# Patient Record
Sex: FEMALE | Race: WHITE | NOT HISPANIC OR LATINO | Employment: FULL TIME | URBAN - METROPOLITAN AREA
[De-identification: names, ages, dates, MRNs, and addresses within clinical notes are randomized per-mention and may not be internally consistent; named-entity substitution may affect disease eponyms.]

---

## 2017-01-12 ENCOUNTER — OFFICE VISIT (OUTPATIENT)
Dept: OPTOMETRY | Facility: CLINIC | Age: 62
End: 2017-01-12
Payer: COMMERCIAL

## 2017-01-12 DIAGNOSIS — H52.203 HYPEROPIA WITH ASTIGMATISM AND PRESBYOPIA, BILATERAL: ICD-10-CM

## 2017-01-12 DIAGNOSIS — Z01.00 ENCOUNTER FOR ROUTINE EYE AND VISION EXAMINATION: Primary | ICD-10-CM

## 2017-01-12 DIAGNOSIS — H52.4 HYPEROPIA WITH ASTIGMATISM AND PRESBYOPIA, BILATERAL: ICD-10-CM

## 2017-01-12 DIAGNOSIS — H52.03 HYPEROPIA WITH ASTIGMATISM AND PRESBYOPIA, BILATERAL: ICD-10-CM

## 2017-01-12 DIAGNOSIS — Z13.5 SCREENING FOR EYE CONDITION: ICD-10-CM

## 2017-01-12 PROCEDURE — 99999 PR PBB SHADOW E&M-EST. PATIENT-LVL III: CPT | Mod: PBBFAC,,, | Performed by: OPTOMETRIST

## 2017-01-12 PROCEDURE — 92004 COMPRE OPH EXAM NEW PT 1/>: CPT | Mod: S$GLB,,, | Performed by: OPTOMETRIST

## 2017-01-12 PROCEDURE — 92015 DETERMINE REFRACTIVE STATE: CPT | Mod: S$GLB,,, | Performed by: OPTOMETRIST

## 2017-01-12 NOTE — PROGRESS NOTES
"HPI     Concerns About Ocular Health    Additional comments: Eye exam and refraction.  No acute ocular/vision   problems.  Good VA with glasses.   Wears full-time            Comments   Patient's age: 61 y.o. WF  Occupation: [employer]  Approximate date of last eye examination:  10/2015  Name of last eye doctor seen: Dr. Reis   City/State: [place]   Wears glasses? Yes      If yes, wears  Full-time or part-time?  Full time   Present glasses are: Bifocal, SV Distance, SV Reading?  Bifocal  Approximate age of present glasses:  15 months    Got new glasses following last exam, or subsequently?:  yes   Any problem with VA with glasses?  No  Wears CLs?:  No  Headaches?  No  Eye pain/discomfort?  No                                                                                     Flashes?  No  Floaters?  No  Diplopia/Double vision?  No  Patient's Ocular History:         Any eye surgeries? No         Any eye injury?  No         Any treatment for eye disease?  No  Family history of eye disease?  No  Significant patient medical history:         1. Diabetes?  No       If yes, IDDM or NIDDM?  n/a   2. HBP?  No              3. Other (describe):     ! OTC eyedrops currently using:  No   ! Prescription eye meds currently using:  No   ! Any history of allergy/adverse reaction to any eye meds used   previously?  No    ! Any history of allergy/adverse reaction to eyedrops used during prior   eye exam(s)? No    ! Any history of allergy/adverse reaction to Novacaine or similar meds?   No   ! Any history of allergy/adverse reaction to Epinephrine or similar meds?   No    ! Patient okay with use of anesthetic eyedrops to check eye pressure?    Yes         ! Patient okay with use of eyedrops to dilate pupils today?  Yes    !  Allergies/Medications/Medical History/Family History reviewed today?    Yes       PD =   63/59  Desired reading distance =  14.5"                                                                Last edited by " Narendra Rothman, OD on 1/12/2017  4:24 PM. (History)            Assessment /Plan     For exam results, see Encounter Report.    1. Encounter for routine eye and vision examination     2. Screening for eye condition     3. Hyperopia with astigmatism and presbyopia, bilateral                      Good ocular health in both eyes.  Hyperopia with astigmatism in each eye, with good correctable VA in each eye.  Presbyopia consistent with age.  New spectacle lens Rx issued for use as desired.  Recommend full-time wear.  Recheck in one year - or prior if any problems noted in the interim

## 2017-01-12 NOTE — MR AVS SNAPSHOT
[facility] - Optometry  [address]  [facility] [address]  Phone: [phone]  Fax: [phone]                  Verena Stephenson   2017 3:45 PM   Office Visit    Description:  Female : 1955   Provider:  Narendra Rothman OD   Department:  [facility] - Optometry           Reason for Visit     Concerns About Ocular Health                To Do List           Goals (5 Years of Data)     None      [facility] On Call     [facility] On Call Nurse [facility] Line -  Assistance  Registered nurses in the [facility] On Call Center provide clinical advisement, health education, appointment booking, and other advisory services.  Call for this free service at [phone].             Medications           Message regarding Medications     Verify the changes and/or additions to your medication regime listed below are the same as discussed with your clinician today.  If any of these changes or additions are incorrect, please notify your healthcare provider.             Verify that the below list of medications is an accurate representation of the medications you are currently taking.  If none reported, the list may be blank. If incorrect, please contact your healthcare provider. Carry this list with you in case of emergency.           Current Medications     clonazePAM (KLONOPIN) 0.5 MG tablet Take 0.5 mg by mouth 2 (two) times daily as needed for Anxiety.    escitalopram oxalate (LEXAPRO) 10 MG tablet Take 1 tablet (10 mg total) by mouth once daily.    gabapentin (NEURONTIN) 100 MG capsule Take 1 capsule (100 mg total) by mouth 3 (three) times daily.    lubiprostone (AMITIZA) 24 MCG Cap Take 1 capsule (24 mcg total) by mouth 2 (two) times daily with meals.    MELOXICAM (MOBIC ORAL) Take by mouth.    ondansetron (ZOFRAN) 4 MG tablet TAKE 1 TABLET (4 MG TOTAL) BY MOUTH DAILY AS NEEDED FOR NAUSEA.    ondansetron (ZOFRAN, AS HYDROCHLORIDE,) 4 MG tablet Take 1 tablet (4 mg total) by mouth every 8 (eight) hours as  needed for Nausea.    pantoprazole (PROTONIX) 40 MG tablet Take 1 tablet (40 mg total) by mouth once daily.    senna-docusate 8.6-50 mg (PERICOLACE) 8.6-50 mg per tablet Take 2 tablets by mouth daily as needed for Constipation.           Clinical Reference Information           Allergies as of 1/12/2017     No Known Allergies      Immunizations Administered on Date of Encounter - 1/12/2017     None      Instructions    Good ocular health in both eyes.  Hyperopia with astigmatism in each eye, with good correctable VA in each eye.  Presbyopia consistent with age.  New spectacle lens Rx issued for use as desired.  Recommend full-time wear.  Recheck in one year - or prior if any problems noted in the interim

## 2017-01-12 NOTE — PATIENT INSTRUCTIONS
Good ocular health in both eyes.  Hyperopia with astigmatism in each eye, with good correctable VA in each eye.  Presbyopia consistent with age.  New spectacle lens Rx issued for use as desired.  Recommend full-time wear.  Recheck in one year - or prior if any problems noted in the interim

## 2017-07-28 PROBLEM — F41.9 ANXIETY: Chronic | Status: ACTIVE | Noted: 2017-07-28

## 2018-02-23 PROBLEM — I10 ESSENTIAL HYPERTENSION: Chronic | Status: ACTIVE | Noted: 2018-02-23

## 2018-05-16 PROBLEM — F41.9 ANXIETY: Chronic | Status: RESOLVED | Noted: 2017-07-28 | Resolved: 2018-05-16

## 2018-08-09 PROBLEM — M41.20 IDIOPATHIC SCOLIOSIS: Chronic | Status: ACTIVE | Noted: 2018-08-09

## 2018-08-09 PROBLEM — F32.9 MAJOR DEPRESSIVE DISORDER: Chronic | Status: ACTIVE | Noted: 2018-08-09

## 2019-01-22 PROBLEM — R68.84 JAW PAIN: Status: ACTIVE | Noted: 2019-01-22

## 2019-04-12 ENCOUNTER — OFFICE VISIT (OUTPATIENT)
Dept: PODIATRY | Facility: CLINIC | Age: 64
End: 2019-04-12
Payer: COMMERCIAL

## 2019-04-12 ENCOUNTER — HOSPITAL ENCOUNTER (OUTPATIENT)
Dept: RADIOLOGY | Facility: CLINIC | Age: 64
Discharge: HOME | End: 2019-04-12
Attending: PODIATRIST
Payer: COMMERCIAL

## 2019-04-12 VITALS
BODY MASS INDEX: 33.79 KG/M2 | SYSTOLIC BLOOD PRESSURE: 119 MMHG | DIASTOLIC BLOOD PRESSURE: 63 MMHG | HEIGHT: 61 IN | WEIGHT: 179 LBS | HEART RATE: 59 BPM

## 2019-04-12 DIAGNOSIS — M72.2 PLANTAR FASCIITIS: ICD-10-CM

## 2019-04-12 DIAGNOSIS — M79.671 FOOT PAIN, BILATERAL: ICD-10-CM

## 2019-04-12 DIAGNOSIS — M79.672 FOOT PAIN, BILATERAL: ICD-10-CM

## 2019-04-12 DIAGNOSIS — M77.9 CAPSULITIS: Primary | ICD-10-CM

## 2019-04-12 DIAGNOSIS — M24.573 EQUINUS CONTRACTURE OF ANKLE: ICD-10-CM

## 2019-04-12 DIAGNOSIS — M77.9 CAPSULITIS: ICD-10-CM

## 2019-04-12 PROCEDURE — 3078F DIAST BP <80 MM HG: CPT | Mod: CPTII,S$GLB,, | Performed by: PODIATRIST

## 2019-04-12 PROCEDURE — 29540 STRAPPING ANKLE &/FOOT: CPT | Mod: 50,S$GLB,, | Performed by: PODIATRIST

## 2019-04-12 PROCEDURE — 3074F PR MOST RECENT SYSTOLIC BLOOD PRESSURE < 130 MM HG: ICD-10-PCS | Mod: CPTII,S$GLB,, | Performed by: PODIATRIST

## 2019-04-12 PROCEDURE — 99999 PR PBB SHADOW E&M-EST. PATIENT-LVL III: CPT | Mod: PBBFAC,,, | Performed by: PODIATRIST

## 2019-04-12 PROCEDURE — 99999 PR PBB SHADOW E&M-EST. PATIENT-LVL III: ICD-10-PCS | Mod: PBBFAC,,, | Performed by: PODIATRIST

## 2019-04-12 PROCEDURE — 29540 PR STRAPPING; ANKLE &/OR FOOT: ICD-10-PCS | Mod: 50,S$GLB,, | Performed by: PODIATRIST

## 2019-04-12 PROCEDURE — 3008F BODY MASS INDEX DOCD: CPT | Mod: CPTII,S$GLB,, | Performed by: PODIATRIST

## 2019-04-12 PROCEDURE — 3008F PR BODY MASS INDEX (BMI) DOCUMENTED: ICD-10-PCS | Mod: CPTII,S$GLB,, | Performed by: PODIATRIST

## 2019-04-12 PROCEDURE — 99203 OFFICE O/P NEW LOW 30 MIN: CPT | Mod: 25,S$GLB,, | Performed by: PODIATRIST

## 2019-04-12 PROCEDURE — 73630 XR FOOT COMPLETE 3 VIEW BILATERAL: ICD-10-PCS | Mod: 26,S$GLB,, | Performed by: RADIOLOGY

## 2019-04-12 PROCEDURE — 73630 X-RAY EXAM OF FOOT: CPT | Mod: 50,TC,FY,PO

## 2019-04-12 PROCEDURE — 73630 X-RAY EXAM OF FOOT: CPT | Mod: 26,S$GLB,, | Performed by: RADIOLOGY

## 2019-04-12 PROCEDURE — 3078F PR MOST RECENT DIASTOLIC BLOOD PRESSURE < 80 MM HG: ICD-10-PCS | Mod: CPTII,S$GLB,, | Performed by: PODIATRIST

## 2019-04-12 PROCEDURE — 99203 PR OFFICE/OUTPT VISIT, NEW, LEVL III, 30-44 MIN: ICD-10-PCS | Mod: 25,S$GLB,, | Performed by: PODIATRIST

## 2019-04-12 PROCEDURE — 3074F SYST BP LT 130 MM HG: CPT | Mod: CPTII,S$GLB,, | Performed by: PODIATRIST

## 2019-04-12 RX ORDER — LIDOCAINE HYDROCHLORIDE 20 MG/ML
JELLY TOPICAL
Qty: 30 ML | Refills: 2 | Status: SHIPPED | OUTPATIENT
Start: 2019-04-12 | End: 2019-04-17

## 2019-04-12 NOTE — PROGRESS NOTES
Subjective:      Patient ID: Verena Stephenson is a 63 y.o. female.    Chief Complaint: Foot Pain (bi lateral)    Burning throbbing pain bottom of heel and forefoot both feet.  Gradual onset, worsening over past several weeks, aggravated by increased weight bearing, shoe gear, pressure.  No previous medical treatment.  OTC pain med not helping. Denies trauma, surgery.    Review of Systems   Constitution: Negative for chills, diaphoresis, fever, malaise/fatigue and night sweats.   Cardiovascular: Negative for claudication, cyanosis, leg swelling and syncope.   Skin: Negative for color change, dry skin, nail changes, rash, suspicious lesions and unusual hair distribution.   Musculoskeletal: Negative for falls, joint pain, joint swelling, muscle cramps, muscle weakness and stiffness.   Gastrointestinal: Negative for constipation, diarrhea, nausea and vomiting.   Neurological: Negative for brief paralysis, disturbances in coordination, focal weakness, numbness, paresthesias, sensory change and tremors.           Objective:      Physical Exam   Constitutional: She is oriented to person, place, and time. She appears well-developed and well-nourished. She is cooperative. No distress.   Cardiovascular:   Pulses:       Popliteal pulses are 2+ on the right side, and 2+ on the left side.        Dorsalis pedis pulses are 2+ on the right side, and 2+ on the left side.        Posterior tibial pulses are 2+ on the right side, and 2+ on the left side.   Capillary refill 3 seconds all toes/distal feet, all toes/both feet warm to touch.      Negative lymphadenopathy bilateral popliteal fossa and tarsal tunnel.      Negavie lower extremity edema bilateral.     Musculoskeletal:        Right ankle: She exhibits normal range of motion, no swelling, no ecchymosis, no deformity, no laceration and normal pulse. Achilles tendon normal. Achilles tendon exhibits no pain, no defect and normal Peter's test results.   Sharp deep pain to  palpation inferior heel bilateral at medial calcaneal tubercle without ecchymosis, erythema, edema, or cardinal signs infection, and no signs of trauma.    Ankle dorsiflexion decreased at <10 degrees bilateral with moderate increase with knee flexion bilateral.    Pain to palpation inferior mtpj 1-5 bilateral without evidence of trauma or infection.    Otherwise, Normal angle, base, station of gait. All ten toes without clubbing, cyanosis, or signs of ischemia.  No pain to palpation bilateral lower extremities.  Range of motion, stability, muscle strength, and muscle tone normal bilateral feet and legs.    Lymphadenopathy: No inguinal adenopathy noted on the right or left side.   Negative lymphadenopathy bilateral popliteal fossa and tarsal tunnel.    Negative lymphangitic streaking bilateral feet/ankles/legs.   Neurological: She is alert and oriented to person, place, and time. She has normal strength. She displays no atrophy and no tremor. No sensory deficit. She exhibits normal muscle tone. Gait normal.   Reflex Scores:       Patellar reflexes are 2+ on the right side and 2+ on the left side.       Achilles reflexes are 2+ on the right side and 2+ on the left side.  Negative tinel sign to percussion sural, superficial peroneal, deep peroneal, saphenous, and posterior tibial nerves right and left ankles and feet.     Skin: Skin is warm, dry and intact. Capillary refill takes 2 to 3 seconds. No abrasion, no bruising, no burn, no ecchymosis, no laceration, no lesion and no rash noted. She is not diaphoretic. No cyanosis or erythema. No pallor. Nails show no clubbing.     Skin is normal age and health appropriate color, turgor, texture, and temperature bilateral lower extremities without ulceration, hyperpigmentation, discoloration, masses nodules or cords palpated.  No ecchymosis, erythema, edema, or cardinal signs of infection bilateral lower extremities.     Psychiatric: She has a normal mood and affect.              Assessment:       Encounter Diagnoses   Name Primary?    Capsulitis Yes    Plantar fasciitis     Foot pain, bilateral     Equinus contracture of ankle          Plan:       Verena was seen today for foot pain.    Diagnoses and all orders for this visit:    Capsulitis  -     X-Ray Foot Complete Bilateral; Future    Plantar fasciitis  -     X-Ray Foot Complete Bilateral; Future    Foot pain, bilateral  -     X-Ray Foot Complete Bilateral; Future    Equinus contracture of ankle  -     X-Ray Foot Complete Bilateral; Future    Other orders  -     lidocaine HCL 2% (XYLOCAINE) 2 % jelly; Apply topically as needed.      I counseled the patient on her conditions, their implications and medical management.        Patient will stretch the tendo achilles complex three times daily as demonstrated in the office.  Literature was dispensed illustrating proper stretching technique.    I applied a plantar rest strapping to the patient's foot to offload symptomatic area, support the arch, and relieve pain.    Patient will obtain over the counter arch supports and wear them in shoes whenever possible.  Athletic shoes intended for walking or running are usually best.    The patient was advised that NSAID-type medications have two very important potential side effects: gastrointestinal irritation including hemorrhage and renal injuries. She was asked to take the medication with food and to stop if she experiences any GI upset. I asked her to call for vomiting, abdominal pain or black/bloody stools. The patient expresses understanding of these issues and questions were answered.    Discussed conservative treatment with shoes of adequate dimensions, material, and style to alleviate symptoms and delay or prevent surgical intervention.    Rx xrays, lidocaine gel.          Follow up in about 1 month (around 5/12/2019).

## 2019-04-17 ENCOUNTER — PATIENT MESSAGE (OUTPATIENT)
Dept: PODIATRY | Facility: CLINIC | Age: 64
End: 2019-04-17

## 2019-04-17 RX ORDER — LIDOCAINE HYDROCHLORIDE 20 MG/ML
JELLY TOPICAL
Qty: 30 ML | Refills: 2 | Status: SHIPPED | OUTPATIENT
Start: 2019-04-17 | End: 2020-02-07

## 2020-05-01 PROBLEM — Z98.890 S/P COLONOSCOPY: Status: ACTIVE | Noted: 2020-05-01

## 2022-09-26 ENCOUNTER — OFFICE VISIT (OUTPATIENT)
Dept: PRIMARY CARE CLINIC | Facility: CLINIC | Age: 67
End: 2022-09-26
Payer: COMMERCIAL

## 2022-09-26 VITALS
DIASTOLIC BLOOD PRESSURE: 60 MMHG | TEMPERATURE: 98 F | HEART RATE: 60 BPM | SYSTOLIC BLOOD PRESSURE: 120 MMHG | HEIGHT: 61 IN | WEIGHT: 172.81 LBS | RESPIRATION RATE: 16 BRPM | BODY MASS INDEX: 32.63 KG/M2 | OXYGEN SATURATION: 95 %

## 2022-09-26 DIAGNOSIS — K21.9 GASTROESOPHAGEAL REFLUX DISEASE WITHOUT ESOPHAGITIS: ICD-10-CM

## 2022-09-26 DIAGNOSIS — F32.1 CURRENT MODERATE EPISODE OF MAJOR DEPRESSIVE DISORDER WITHOUT PRIOR EPISODE: Chronic | ICD-10-CM

## 2022-09-26 DIAGNOSIS — F41.1 GAD (GENERALIZED ANXIETY DISORDER): ICD-10-CM

## 2022-09-26 DIAGNOSIS — G25.81 RESTLESS LEG SYNDROME: Chronic | ICD-10-CM

## 2022-09-26 DIAGNOSIS — F51.01 PRIMARY INSOMNIA: ICD-10-CM

## 2022-09-26 DIAGNOSIS — Z76.89 ENCOUNTER TO ESTABLISH CARE: Primary | ICD-10-CM

## 2022-09-26 DIAGNOSIS — I10 ESSENTIAL HYPERTENSION: Chronic | ICD-10-CM

## 2022-09-26 DIAGNOSIS — E66.9 OBESITY (BMI 30.0-34.9): ICD-10-CM

## 2022-09-26 PROCEDURE — 3044F HG A1C LEVEL LT 7.0%: CPT | Mod: CPTII,S$GLB,, | Performed by: STUDENT IN AN ORGANIZED HEALTH CARE EDUCATION/TRAINING PROGRAM

## 2022-09-26 PROCEDURE — 1160F PR REVIEW ALL MEDS BY PRESCRIBER/CLIN PHARMACIST DOCUMENTED: ICD-10-PCS | Mod: CPTII,S$GLB,, | Performed by: STUDENT IN AN ORGANIZED HEALTH CARE EDUCATION/TRAINING PROGRAM

## 2022-09-26 PROCEDURE — 99204 PR OFFICE/OUTPT VISIT, NEW, LEVL IV, 45-59 MIN: ICD-10-PCS | Mod: S$GLB,,, | Performed by: STUDENT IN AN ORGANIZED HEALTH CARE EDUCATION/TRAINING PROGRAM

## 2022-09-26 PROCEDURE — 99999 PR PBB SHADOW E&M-EST. PATIENT-LVL V: CPT | Mod: PBBFAC,,, | Performed by: STUDENT IN AN ORGANIZED HEALTH CARE EDUCATION/TRAINING PROGRAM

## 2022-09-26 PROCEDURE — 3008F BODY MASS INDEX DOCD: CPT | Mod: CPTII,S$GLB,, | Performed by: STUDENT IN AN ORGANIZED HEALTH CARE EDUCATION/TRAINING PROGRAM

## 2022-09-26 PROCEDURE — 3074F PR MOST RECENT SYSTOLIC BLOOD PRESSURE < 130 MM HG: ICD-10-PCS | Mod: CPTII,S$GLB,, | Performed by: STUDENT IN AN ORGANIZED HEALTH CARE EDUCATION/TRAINING PROGRAM

## 2022-09-26 PROCEDURE — 99204 OFFICE O/P NEW MOD 45 MIN: CPT | Mod: S$GLB,,, | Performed by: STUDENT IN AN ORGANIZED HEALTH CARE EDUCATION/TRAINING PROGRAM

## 2022-09-26 PROCEDURE — 3044F PR MOST RECENT HEMOGLOBIN A1C LEVEL <7.0%: ICD-10-PCS | Mod: CPTII,S$GLB,, | Performed by: STUDENT IN AN ORGANIZED HEALTH CARE EDUCATION/TRAINING PROGRAM

## 2022-09-26 PROCEDURE — 3008F PR BODY MASS INDEX (BMI) DOCUMENTED: ICD-10-PCS | Mod: CPTII,S$GLB,, | Performed by: STUDENT IN AN ORGANIZED HEALTH CARE EDUCATION/TRAINING PROGRAM

## 2022-09-26 PROCEDURE — 3078F DIAST BP <80 MM HG: CPT | Mod: CPTII,S$GLB,, | Performed by: STUDENT IN AN ORGANIZED HEALTH CARE EDUCATION/TRAINING PROGRAM

## 2022-09-26 PROCEDURE — 1160F RVW MEDS BY RX/DR IN RCRD: CPT | Mod: CPTII,S$GLB,, | Performed by: STUDENT IN AN ORGANIZED HEALTH CARE EDUCATION/TRAINING PROGRAM

## 2022-09-26 PROCEDURE — 1159F MED LIST DOCD IN RCRD: CPT | Mod: CPTII,S$GLB,, | Performed by: STUDENT IN AN ORGANIZED HEALTH CARE EDUCATION/TRAINING PROGRAM

## 2022-09-26 PROCEDURE — 99999 PR PBB SHADOW E&M-EST. PATIENT-LVL V: ICD-10-PCS | Mod: PBBFAC,,, | Performed by: STUDENT IN AN ORGANIZED HEALTH CARE EDUCATION/TRAINING PROGRAM

## 2022-09-26 PROCEDURE — 1159F PR MEDICATION LIST DOCUMENTED IN MEDICAL RECORD: ICD-10-PCS | Mod: CPTII,S$GLB,, | Performed by: STUDENT IN AN ORGANIZED HEALTH CARE EDUCATION/TRAINING PROGRAM

## 2022-09-26 PROCEDURE — 3074F SYST BP LT 130 MM HG: CPT | Mod: CPTII,S$GLB,, | Performed by: STUDENT IN AN ORGANIZED HEALTH CARE EDUCATION/TRAINING PROGRAM

## 2022-09-26 PROCEDURE — 3078F PR MOST RECENT DIASTOLIC BLOOD PRESSURE < 80 MM HG: ICD-10-PCS | Mod: CPTII,S$GLB,, | Performed by: STUDENT IN AN ORGANIZED HEALTH CARE EDUCATION/TRAINING PROGRAM

## 2022-09-26 RX ORDER — BUSPIRONE HYDROCHLORIDE 5 MG/1
TABLET ORAL
Qty: 60 TABLET | Refills: 2 | Status: SHIPPED | OUTPATIENT
Start: 2022-09-26 | End: 2022-10-26 | Stop reason: SDUPTHER

## 2022-09-26 RX ORDER — B-COMPLEX WITH VITAMIN C
1 TABLET ORAL DAILY
COMMUNITY
End: 2023-05-17

## 2022-09-26 RX ORDER — TEMAZEPAM 15 MG/1
CAPSULE ORAL
Qty: 45 CAPSULE | Refills: 0 | Status: SHIPPED | OUTPATIENT
Start: 2022-09-26 | End: 2022-10-26 | Stop reason: SDUPTHER

## 2022-09-26 RX ORDER — ACETAMINOPHEN, DIPHENHYDRAMINE HCL, PHENYLEPHRINE HCL 325; 25; 5 MG/1; MG/1; MG/1
1 TABLET ORAL NIGHTLY PRN
COMMUNITY

## 2022-09-26 NOTE — PATIENT INSTRUCTIONS
Establish care:   -     General anxiety disorder:   - patient is tried several SSRIs previously including Prozac Lexapro and Cymbalta.  States that they were not helpful.  Was switched to Wellbutrin 150 mg daily has found that to not be helpful additionally.   - will stop Wellbutrin.   - start BuSpar 5 mg once a day after 3 days increase to twice a day, after 3 more days increase to 3 times a day.  Then after 3 more days take 10 mg twice a day.    -would recommend trying to get a counselor, will provide a contact number for [facility] [place] counseling services.    Insomnia:   - patient currently using Restoril and melatonin 30 mg at night.  Will continue current Emil but advised that will need to change to an alternative to Restoril if we would like to use Ativan, or another benzodiazepine and intermittently to address her anxiety.  Will discuss this at follow-up appointment.  - will consider starting on trazodone and next appointment, but would not want to start that medication will also starting BuSpar.    GERD:   - patient with intermittent acid reflux or bloating sensation.  Uses omeprazole sparingly.  Can continue with current plan.    Hypertension:   - blood pressure well controlled today, continue amlodipine as previous.    Hyperlipidemia:   - patient was started on statin, but does not appear necessary and is not being used, so can stop at this time.    Low Vit D:   - labs show patient use to have a low vitamin-D, is now up in the 60s.  Advised she can decrease her taking vitamin-D 50,000iu once a week to once a month.

## 2022-09-26 NOTE — PROGRESS NOTES
Subjective:           Patient ID: Verena Stephenson is a 66 y.o. female who presents today with a chief complaint of est care.    Chief Complaint:   Establish Care, Depression, and Anxiety      History of Present Illness:    67yo female presenting for est care appt.     Has been very stressed.  Has used Prozac, Lexapro and Cymbalta without relief.  Currenrtly on Wellbutrin 150mg daily and that has not been helpful.    Has tried to get into counseling and has not been able to afford that.  Says can't take another bill.     Uses the Xanax at times and it will be helpful for a few days.  Paulo if gets 1/2 a tab weekly to help. Has used some from her daughter and finds that helpful.      States she currently feels like she is drowning. Has only about 2 good days in a month and the rest are all stress.    Has not been able to stay at work due to anxiety.  Has been needing to leave at lunch repeatedly due to anxiety and depression.    HLD:  Lipids show last HDL 44 and .    Was ordered a statin, but has never really taken.    Elevated glucose:  Glucose checked have been elevated, but normal A1c on last check.     Insomnia:   - has been waking at 3AM constantly.    Takes Temazepam 15mg nightly and Melatonin 30mg nightly but wakes in early AM and cannot get back to sleep.    Constipation:  Chronic issue.    Headaches:  chronic tension headaches.  Uses Meloxicam PRN.    GERD:   - uses Omeprazole PRN.    Review of Systems   Constitutional:  Negative for activity change, fatigue, fever and unexpected weight change.   HENT:  Negative for congestion, nosebleeds, sinus pressure and sneezing.    Respiratory:  Negative for cough, shortness of breath and wheezing.    Cardiovascular:  Negative for chest pain, palpitations and leg swelling.   Gastrointestinal:  Positive for constipation and nausea. Negative for abdominal distention and diarrhea.   Genitourinary:  Negative for difficulty urinating and dysuria.   Musculoskeletal:   "Negative for back pain and gait problem.   Skin:  Negative for pallor and rash.   Neurological:  Positive for headaches. Negative for weakness and numbness.   Psychiatric/Behavioral:  Positive for agitation and sleep disturbance. The patient is nervous/anxious.          Objective:        Vitals:    09/26/22 0924   BP: 120/60   BP Location: Right arm   Patient Position: Sitting   BP Method: Medium (Manual)   Pulse: 60   Resp: 16   Temp: 97.8 °F (36.6 °C)   TempSrc: Oral   SpO2: 95%   Weight: 78.4 kg (172 lb 13.5 oz)   Height: 5' 1" (1.549 m)       Body mass index is 32.66 kg/m².      Physical Exam  Constitutional:       General: She is not in acute distress.     Appearance: Normal appearance. She is obese.      Comments: As per BMI.   HENT:      Head: Normocephalic.      Right Ear: Tympanic membrane and external ear normal.      Left Ear: Tympanic membrane and external ear normal.      Mouth/Throat:      Mouth: Mucous membranes are moist.   Eyes:      Extraocular Movements: Extraocular movements intact.      Conjunctiva/sclera: Conjunctivae normal.   Cardiovascular:      Rate and Rhythm: Normal rate and regular rhythm.      Heart sounds: No murmur heard.    No gallop.   Pulmonary:      Effort: Pulmonary effort is normal. No respiratory distress.      Breath sounds: Normal breath sounds.   Abdominal:      General: Abdomen is flat. Bowel sounds are normal. There is no distension.      Palpations: Abdomen is soft.   Musculoskeletal:         General: No swelling or deformity.      Right lower leg: No edema.      Left lower leg: No edema.   Lymphadenopathy:      Cervical: No cervical adenopathy.   Skin:     General: Skin is warm.      Capillary Refill: Capillary refill takes less than 2 seconds.      Coloration: Skin is not jaundiced.   Neurological:      General: No focal deficit present.      Mental Status: She is alert and oriented to person, place, and time.      Motor: No weakness.   Psychiatric:      Comments: " Tearful during interview.           Lab Results   Component Value Date     09/21/2022    K 3.4 (L) 09/21/2022     09/21/2022    CO2 31 09/21/2022    BUN 13 09/21/2022    CREATININE 0.79 09/21/2022     Lab Results   Component Value Date    HGBA1C 5.4 09/21/2022     No results found for: BNP, BNPTRIAGEBLO    Lab Results   Component Value Date    WBC 6.9 09/21/2022    HGB 14.8 09/21/2022    HCT 45.4 (H) 09/21/2022     09/21/2022     Lab Results   Component Value Date    CHOL 180 09/21/2022    HDL 44 (L) 09/21/2022    LDLCALC 115 (H) 09/21/2022    TRIG 104 09/21/2022          Current Outpatient Medications:     amLODIPine (NORVASC) 5 MG tablet, Take 1 tablet (5 mg total) by mouth once daily., Disp: 90 tablet, Rfl: 0    B-complex with vitamin C (Z-BEC OR EQUIV) tablet, Take 1 tablet by mouth once daily., Disp: , Rfl:     buPROPion (WELLBUTRIN XL) 150 MG TB24 tablet, Take 1 tablet (150 mg total) by mouth once daily., Disp: 90 tablet, Rfl: 0    ergocalciferol (VITAMIN D2) 50,000 unit Cap, Take 1 capsule (50,000 Units total) by mouth every 7 days., Disp: 12 capsule, Rfl: 0    gabapentin (NEURONTIN) 300 MG capsule, Take 1 capsule (300 mg total) by mouth every evening., Disp: 90 capsule, Rfl: 0    melatonin 10 mg Tab, Take by mouth., Disp: , Rfl:     meloxicam (MOBIC) 15 MG tablet, TAKE 1 TABLET BY MOUTH DAILY AS NEEDED FOR PAIN., Disp: 30 tablet, Rfl: 1    omeprazole (PRILOSEC) 40 MG capsule, Take 1 capsule (40 mg total) by mouth every morning., Disp: 90 capsule, Rfl: 0    busPIRone (BUSPAR) 5 MG Tab, Take 1 tablet (5 mg total) by mouth once daily for 3 days, THEN 1 tablet (5 mg total) 2 (two) times daily for 3 days, THEN 1 tablet (5 mg total) 3 (three) times daily for 3 days, THEN 2 tablets (10 mg total) 2 (two) times daily for 21 days., Disp: 60 tablet, Rfl: 2    temazepam (RESTORIL) 15 mg Cap, TAKE 1 CAPSULE BY MOUTH NIGHTLY AS NEEDED, Disp: 45 capsule, Rfl: 0  No current facility-administered  medications for this visit.     Outpatient Encounter Medications as of 9/26/2022   Medication Sig Dispense Refill    amLODIPine (NORVASC) 5 MG tablet Take 1 tablet (5 mg total) by mouth once daily. 90 tablet 0    B-complex with vitamin C (Z-BEC OR EQUIV) tablet Take 1 tablet by mouth once daily.      buPROPion (WELLBUTRIN XL) 150 MG TB24 tablet Take 1 tablet (150 mg total) by mouth once daily. 90 tablet 0    ergocalciferol (VITAMIN D2) 50,000 unit Cap Take 1 capsule (50,000 Units total) by mouth every 7 days. 12 capsule 0    gabapentin (NEURONTIN) 300 MG capsule Take 1 capsule (300 mg total) by mouth every evening. 90 capsule 0    melatonin 10 mg Tab Take by mouth.      meloxicam (MOBIC) 15 MG tablet TAKE 1 TABLET BY MOUTH DAILY AS NEEDED FOR PAIN. 30 tablet 1    omeprazole (PRILOSEC) 40 MG capsule Take 1 capsule (40 mg total) by mouth every morning. 90 capsule 0    [DISCONTINUED] temazepam (RESTORIL) 15 mg Cap TAKE 1 CAPSULE BY MOUTH NIGHTLY AS NEEDED 45 capsule 0    busPIRone (BUSPAR) 5 MG Tab Take 1 tablet (5 mg total) by mouth once daily for 3 days, THEN 1 tablet (5 mg total) 2 (two) times daily for 3 days, THEN 1 tablet (5 mg total) 3 (three) times daily for 3 days, THEN 2 tablets (10 mg total) 2 (two) times daily for 21 days. 60 tablet 2    temazepam (RESTORIL) 15 mg Cap TAKE 1 CAPSULE BY MOUTH NIGHTLY AS NEEDED 45 capsule 0    [DISCONTINUED] clarithromycin (BIAXIN) 250 MG tablet Take 1 tablet (250 mg total) by mouth every 12 (twelve) hours. 10 tablet 0    [DISCONTINUED] methylPREDNISolone (MEDROL DOSEPACK) 4 mg tablet Take 1 tablet (4 mg total) by mouth once daily. use as directed 21 each 0     Facility-Administered Encounter Medications as of 9/26/2022   Medication Dose Route Frequency Provider Last Rate Last Admin    [DISCONTINUED] dexamethasone injection 4 mg  4 mg Intramuscular 1 time in Clinic/[facility] Xavi Goel MD              Assessment:       1. Encounter to establish care    2. JENNA (generalized  anxiety disorder)    3. Essential hypertension    4. Current moderate episode of major depressive disorder without prior episode    5. Restless leg syndrome    6. Gastroesophageal reflux disease without esophagitis    7. Obesity (BMI 30.0-34.9)    8. Primary insomnia           Plan:       Encounter to establish care    JENNA (generalized anxiety disorder)  -     busPIRone (BUSPAR) 5 MG Tab; Take 1 tablet (5 mg total) by mouth once daily for 3 days, THEN 1 tablet (5 mg total) 2 (two) times daily for 3 days, THEN 1 tablet (5 mg total) 3 (three) times daily for 3 days, THEN 2 tablets (10 mg total) 2 (two) times daily for 21 days.  Dispense: 60 tablet; Refill: 2  -     temazepam (RESTORIL) 15 mg Cap; TAKE 1 CAPSULE BY MOUTH NIGHTLY AS NEEDED  Dispense: 45 capsule; Refill: 0    Essential hypertension    Current moderate episode of major depressive disorder without prior episode    Restless leg syndrome    Gastroesophageal reflux disease without esophagitis    Obesity (BMI 30.0-34.9)    Primary insomnia  -     temazepam (RESTORIL) 15 mg Cap; TAKE 1 CAPSULE BY MOUTH NIGHTLY AS NEEDED  Dispense: 45 capsule; Refill: 0             Establish care:   -     General anxiety disorder:   - patient is tried several SSRIs previously including Prozac Lexapro and Cymbalta.  States that they were not helpful.  Was switched to Wellbutrin 150 mg daily has found that to not be helpful additionally.   - will stop Wellbutrin.   - start BuSpar 5 mg once a day after 3 days increase to twice a day, after 3 more days increase to 3 times a day.  Then after 3 more days take 10 mg twice a day.    -would recommend trying to get a counselor, will provide a contact number for [facility] counseling services.    Insomnia:   - patient currently using Restoril and melatonin 30 mg at night.  Will continue current Emil but advised that will need to change to an alternative to Restoril if we would like to use Ativan, or another benzodiazepine and  intermittently to address her anxiety.  Will discuss this at follow-up appointment.  - will consider starting on trazodone and next appointment, but would not want to start that medication will also starting BuSpar.    GERD:   - patient with intermittent acid reflux or bloating sensation.  Uses omeprazole sparingly.  Can continue with current plan.    Hypertension:   - blood pressure well controlled today, continue amlodipine as previous.    Hyperlipidemia:   - patient was started on statin, but does not appear necessary and is not being used, so can stop at this time.    Low Vit D:   - labs show patient use to have a low vitamin-D, is now up in the 60s.  Advised she can decrease her taking vitamin-D 50,000iu once a week to once a month.

## 2022-09-27 ENCOUNTER — PATIENT MESSAGE (OUTPATIENT)
Dept: PRIMARY CARE CLINIC | Facility: CLINIC | Age: 67
End: 2022-09-27
Payer: COMMERCIAL

## 2022-10-20 ENCOUNTER — PATIENT MESSAGE (OUTPATIENT)
Dept: PRIMARY CARE CLINIC | Facility: CLINIC | Age: 67
End: 2022-10-20
Payer: COMMERCIAL

## 2022-10-26 ENCOUNTER — OFFICE VISIT (OUTPATIENT)
Dept: PRIMARY CARE CLINIC | Facility: CLINIC | Age: 67
End: 2022-10-26
Payer: COMMERCIAL

## 2022-10-26 VITALS
RESPIRATION RATE: 16 BRPM | TEMPERATURE: 97 F | HEART RATE: 53 BPM | SYSTOLIC BLOOD PRESSURE: 114 MMHG | HEIGHT: 61 IN | WEIGHT: 168.56 LBS | OXYGEN SATURATION: 96 % | BODY MASS INDEX: 31.83 KG/M2 | DIASTOLIC BLOOD PRESSURE: 60 MMHG

## 2022-10-26 DIAGNOSIS — G25.81 RESTLESS LEG SYNDROME: Chronic | ICD-10-CM

## 2022-10-26 DIAGNOSIS — F51.01 PRIMARY INSOMNIA: ICD-10-CM

## 2022-10-26 DIAGNOSIS — F41.1 GAD (GENERALIZED ANXIETY DISORDER): Primary | ICD-10-CM

## 2022-10-26 DIAGNOSIS — F32.1 CURRENT MODERATE EPISODE OF MAJOR DEPRESSIVE DISORDER WITHOUT PRIOR EPISODE: Chronic | ICD-10-CM

## 2022-10-26 PROCEDURE — 1159F MED LIST DOCD IN RCRD: CPT | Mod: CPTII,S$GLB,, | Performed by: STUDENT IN AN ORGANIZED HEALTH CARE EDUCATION/TRAINING PROGRAM

## 2022-10-26 PROCEDURE — 1160F RVW MEDS BY RX/DR IN RCRD: CPT | Mod: CPTII,S$GLB,, | Performed by: STUDENT IN AN ORGANIZED HEALTH CARE EDUCATION/TRAINING PROGRAM

## 2022-10-26 PROCEDURE — 99999 PR PBB SHADOW E&M-EST. PATIENT-LVL IV: CPT | Mod: PBBFAC,,, | Performed by: STUDENT IN AN ORGANIZED HEALTH CARE EDUCATION/TRAINING PROGRAM

## 2022-10-26 PROCEDURE — 1160F PR REVIEW ALL MEDS BY PRESCRIBER/CLIN PHARMACIST DOCUMENTED: ICD-10-PCS | Mod: CPTII,S$GLB,, | Performed by: STUDENT IN AN ORGANIZED HEALTH CARE EDUCATION/TRAINING PROGRAM

## 2022-10-26 PROCEDURE — 3044F PR MOST RECENT HEMOGLOBIN A1C LEVEL <7.0%: ICD-10-PCS | Mod: CPTII,S$GLB,, | Performed by: STUDENT IN AN ORGANIZED HEALTH CARE EDUCATION/TRAINING PROGRAM

## 2022-10-26 PROCEDURE — 1159F PR MEDICATION LIST DOCUMENTED IN MEDICAL RECORD: ICD-10-PCS | Mod: CPTII,S$GLB,, | Performed by: STUDENT IN AN ORGANIZED HEALTH CARE EDUCATION/TRAINING PROGRAM

## 2022-10-26 PROCEDURE — 3074F PR MOST RECENT SYSTOLIC BLOOD PRESSURE < 130 MM HG: ICD-10-PCS | Mod: CPTII,S$GLB,, | Performed by: STUDENT IN AN ORGANIZED HEALTH CARE EDUCATION/TRAINING PROGRAM

## 2022-10-26 PROCEDURE — 3078F DIAST BP <80 MM HG: CPT | Mod: CPTII,S$GLB,, | Performed by: STUDENT IN AN ORGANIZED HEALTH CARE EDUCATION/TRAINING PROGRAM

## 2022-10-26 PROCEDURE — 99214 OFFICE O/P EST MOD 30 MIN: CPT | Mod: S$GLB,,, | Performed by: STUDENT IN AN ORGANIZED HEALTH CARE EDUCATION/TRAINING PROGRAM

## 2022-10-26 PROCEDURE — 3078F PR MOST RECENT DIASTOLIC BLOOD PRESSURE < 80 MM HG: ICD-10-PCS | Mod: CPTII,S$GLB,, | Performed by: STUDENT IN AN ORGANIZED HEALTH CARE EDUCATION/TRAINING PROGRAM

## 2022-10-26 PROCEDURE — 3044F HG A1C LEVEL LT 7.0%: CPT | Mod: CPTII,S$GLB,, | Performed by: STUDENT IN AN ORGANIZED HEALTH CARE EDUCATION/TRAINING PROGRAM

## 2022-10-26 PROCEDURE — 99999 PR PBB SHADOW E&M-EST. PATIENT-LVL IV: ICD-10-PCS | Mod: PBBFAC,,, | Performed by: STUDENT IN AN ORGANIZED HEALTH CARE EDUCATION/TRAINING PROGRAM

## 2022-10-26 PROCEDURE — 99214 PR OFFICE/OUTPT VISIT, EST, LEVL IV, 30-39 MIN: ICD-10-PCS | Mod: S$GLB,,, | Performed by: STUDENT IN AN ORGANIZED HEALTH CARE EDUCATION/TRAINING PROGRAM

## 2022-10-26 PROCEDURE — 3074F SYST BP LT 130 MM HG: CPT | Mod: CPTII,S$GLB,, | Performed by: STUDENT IN AN ORGANIZED HEALTH CARE EDUCATION/TRAINING PROGRAM

## 2022-10-26 RX ORDER — TEMAZEPAM 15 MG/1
CAPSULE ORAL
Qty: 45 CAPSULE | Refills: 0 | Status: SHIPPED | OUTPATIENT
Start: 2022-11-10 | End: 2023-01-12 | Stop reason: SDUPTHER

## 2022-10-26 RX ORDER — BUSPIRONE HYDROCHLORIDE 5 MG/1
10 TABLET ORAL 2 TIMES DAILY
Qty: 180 TABLET | Refills: 3 | Status: SHIPPED | OUTPATIENT
Start: 2022-10-26 | End: 2023-01-12

## 2022-10-26 RX ORDER — GABAPENTIN 300 MG/1
300 CAPSULE ORAL NIGHTLY
Qty: 90 CAPSULE | Refills: 4 | Status: SHIPPED | OUTPATIENT
Start: 2022-10-26 | End: 2023-05-17

## 2022-10-26 RX ORDER — TEMAZEPAM 15 MG/1
CAPSULE ORAL
Qty: 45 CAPSULE | Refills: 0 | Status: SHIPPED | OUTPATIENT
Start: 2022-11-10 | End: 2022-10-26 | Stop reason: SDUPTHER

## 2022-10-26 RX ORDER — ESCITALOPRAM OXALATE 20 MG/1
20 TABLET ORAL DAILY
Qty: 90 TABLET | Refills: 4 | Status: SHIPPED | OUTPATIENT
Start: 2022-10-26 | End: 2023-05-17

## 2022-10-26 NOTE — PATIENT INSTRUCTIONS
Anxiety/depression:   - patient has been having significant depression anxiety, has not been able to remain at work most days.  States that she just does not want to be there, will leave early from work.  When she gets home typically will go to sleep, that have difficulty sleeping at night.   - was taking Lexapro/escitalopram for several years, but was discontinued in July due to perceived lack of affect and switch to Wellbutrin.  Did not tolerate Wellbutrin well.  In retrospect feels that the citalopram may have been quite effective.   - resume escitalopram (Lexapro) 20 mg daily, start on ½ tab for a week, then full tab after.   - continue Buspar 10mg twice daily for anxiety.   - would recommend getting an appointment with a counselor, has been given contact number for [facility] counselors.    Insomnia:    - patient has used temazepam for sleep aid, had previously been using every 2nd or 3rd night, but more recently is using every night.  Was advised that regularly uses medication can make it less effective.  Would recommend trying to alternate with nights using melatonin.    - Additionally may find that she is better able to get sleep at night if avoiding these afternoon naps that she has been having due to her depression.  Hopefully with re-starting Lexapro or fine she can avoid the evening naps and get better sleep at night.    Restless Leg:   - also using Gabapentin 300mg nightly to help with sleep due to restless leg s/s.

## 2022-10-26 NOTE — PROGRESS NOTES
Subjective:           Patient ID: Verena Stephenson is a 66 y.o. female who presents today with a chief complaint of anxiety and insomina.    Chief Complaint:   Follow-up (Anxiety & insomnia)      History of Present Illness:    65yo female presenting for anxiety.  States that she has not been able to stay at work almost ever, most days need to get home and take a nap for a few hours.    Work day is 7-3:30PM, but says only lasted full work day 4 times in last month.    Does not feel like staying at work.   States is not happy at work.  Does not want to be there.  Just wants to get home.  Then when she gets home just sits in bed, will sleep for hours during the day.     At [facility] on 9/26/2022 was advised to start on Buspar 5mg once a day after 3 days increase to twice a day, after 3 more days increase to 3 times a day.  Then after 3 more days take 10 mg twice a day.    Has been taking up to 3 times daily, but went back down to BID as she was going to run out of tabs.    Tried several SSRIs previously including Prozac Lexapro and Cymbalta.  Also tried Wellbutrin from July to September but that did not help either.    Does not feel that she is doing any better at this time.     Has been getting sleep at night with the Temazepam but otherwise would not be sleeping.     Has not seen a counselor before.           Review of Systems   Constitutional:  Positive for fatigue. Negative for activity change, fever and unexpected weight change.   HENT:  Negative for congestion, nosebleeds, sinus pressure and sneezing.    Respiratory:  Negative for cough, shortness of breath and wheezing.    Cardiovascular:  Negative for chest pain, palpitations and leg swelling.   Gastrointestinal:  Positive for constipation and nausea. Negative for abdominal distention and diarrhea.   Genitourinary:  Negative for difficulty urinating and dysuria.   Musculoskeletal:  Negative for back pain and gait problem.   Skin:  Negative for pallor and rash.  "  Neurological:  Positive for headaches. Negative for weakness and numbness.   Psychiatric/Behavioral:  Positive for agitation, dysphoric mood and sleep disturbance (sleeping after work daily, then poor sleep at night.). The patient is nervous/anxious.          Objective:        Vitals:    10/26/22 0925   BP: 114/60   BP Location: Right arm   Patient Position: Sitting   BP Method: Medium (Manual)   Pulse: (!) 53   Resp: 16   Temp: 97.4 °F (36.3 °C)   TempSrc: Oral   SpO2: 96%   Weight: 76.5 kg (168 lb 8.7 oz)   Height: 5' 1" (1.549 m)       Body mass index is 31.85 kg/m².      Physical Exam  Constitutional:       General: She is not in acute distress.     Appearance: Normal appearance. She is obese.      Comments: As per BMI.   HENT:      Head: Normocephalic.      Right Ear: External ear normal.      Left Ear: External ear normal.      Mouth/Throat:      Mouth: Mucous membranes are moist.   Eyes:      Extraocular Movements: Extraocular movements intact.      Conjunctiva/sclera: Conjunctivae normal.   Cardiovascular:      Rate and Rhythm: Normal rate.      Heart sounds:     No gallop.   Pulmonary:      Effort: Pulmonary effort is normal. No respiratory distress.      Breath sounds: Normal breath sounds.   Abdominal:      General: Abdomen is flat. Bowel sounds are normal. There is no distension.      Palpations: Abdomen is soft.   Musculoskeletal:         General: No swelling or deformity.      Right lower leg: No edema.      Left lower leg: No edema.   Lymphadenopathy:      Cervical: No cervical adenopathy.   Skin:     General: Skin is warm.      Capillary Refill: Capillary refill takes less than 2 seconds.      Coloration: Skin is not jaundiced.   Neurological:      General: No focal deficit present.      Mental Status: She is alert and oriented to person, place, and time.      Motor: No weakness.   Psychiatric:      Comments: Less upset today vs last appt, but still showing anxiety about her situation.     "       Lab Results   Component Value Date     09/21/2022    K 3.4 (L) 09/21/2022     09/21/2022    CO2 31 09/21/2022    BUN 13 09/21/2022    CREATININE 0.79 09/21/2022     Lab Results   Component Value Date    HGBA1C 5.4 09/21/2022     No results found for: BNP, BNPTRIAGEBLO    Lab Results   Component Value Date    WBC 6.9 09/21/2022    HGB 14.8 09/21/2022    HCT 45.4 (H) 09/21/2022     09/21/2022     Lab Results   Component Value Date    CHOL 180 09/21/2022    HDL 44 (L) 09/21/2022    LDLCALC 115 (H) 09/21/2022    TRIG 104 09/21/2022          Current Outpatient Medications:     amLODIPine (NORVASC) 5 MG tablet, Take 1 tablet (5 mg total) by mouth once daily., Disp: 90 tablet, Rfl: 0    B-complex with vitamin C (Z-BEC OR EQUIV) tablet, Take 1 tablet by mouth once daily., Disp: , Rfl:     ergocalciferol (VITAMIN D2) 50,000 unit Cap, Take 1 capsule (50,000 Units total) by mouth every 7 days., Disp: 12 capsule, Rfl: 0    melatonin 10 mg Tab, Take by mouth., Disp: , Rfl:     meloxicam (MOBIC) 15 MG tablet, TAKE 1 TABLET BY MOUTH DAILY AS NEEDED FOR PAIN., Disp: 30 tablet, Rfl: 1    omeprazole (PRILOSEC) 40 MG capsule, Take 1 capsule (40 mg total) by mouth every morning., Disp: 90 capsule, Rfl: 0    busPIRone (BUSPAR) 5 MG Tab, Take 2 tablets (10 mg total) by mouth 2 (two) times daily., Disp: 180 tablet, Rfl: 3    EScitalopram oxalate (LEXAPRO) 20 MG tablet, Take 1 tablet (20 mg total) by mouth once daily., Disp: 90 tablet, Rfl: 4    gabapentin (NEURONTIN) 300 MG capsule, Take 1 capsule (300 mg total) by mouth every evening., Disp: 90 capsule, Rfl: 4    [START ON 11/10/2022] temazepam (RESTORIL) 15 mg Cap, TAKE 1 CAPSULE BY MOUTH NIGHTLY AS NEEDED, Disp: 45 capsule, Rfl: 0     Outpatient Encounter Medications as of 10/26/2022   Medication Sig Dispense Refill    amLODIPine (NORVASC) 5 MG tablet Take 1 tablet (5 mg total) by mouth once daily. 90 tablet 0    B-complex with vitamin C (Z-BEC OR EQUIV) tablet  Take 1 tablet by mouth once daily.      ergocalciferol (VITAMIN D2) 50,000 unit Cap Take 1 capsule (50,000 Units total) by mouth every 7 days. 12 capsule 0    melatonin 10 mg Tab Take by mouth.      meloxicam (MOBIC) 15 MG tablet TAKE 1 TABLET BY MOUTH DAILY AS NEEDED FOR PAIN. 30 tablet 1    omeprazole (PRILOSEC) 40 MG capsule Take 1 capsule (40 mg total) by mouth every morning. 90 capsule 0    [DISCONTINUED] busPIRone (BUSPAR) 5 MG Tab Take 1 tablet (5 mg total) by mouth once daily for 3 days, THEN 1 tablet (5 mg total) 2 (two) times daily for 3 days, THEN 1 tablet (5 mg total) 3 (three) times daily for 3 days, THEN 2 tablets (10 mg total) 2 (two) times daily for 21 days. 60 tablet 2    [DISCONTINUED] gabapentin (NEURONTIN) 300 MG capsule Take 1 capsule (300 mg total) by mouth every evening. 90 capsule 0    [DISCONTINUED] temazepam (RESTORIL) 15 mg Cap TAKE 1 CAPSULE BY MOUTH NIGHTLY AS NEEDED 45 capsule 0    busPIRone (BUSPAR) 5 MG Tab Take 2 tablets (10 mg total) by mouth 2 (two) times daily. 180 tablet 3    EScitalopram oxalate (LEXAPRO) 20 MG tablet Take 1 tablet (20 mg total) by mouth once daily. 90 tablet 4    gabapentin (NEURONTIN) 300 MG capsule Take 1 capsule (300 mg total) by mouth every evening. 90 capsule 4    [START ON 11/10/2022] temazepam (RESTORIL) 15 mg Cap TAKE 1 CAPSULE BY MOUTH NIGHTLY AS NEEDED 45 capsule 0    [DISCONTINUED] buPROPion (WELLBUTRIN XL) 150 MG TB24 tablet Take 1 tablet (150 mg total) by mouth once daily. 90 tablet 0    [DISCONTINUED] temazepam (RESTORIL) 15 mg Cap TAKE 1 CAPSULE BY MOUTH NIGHTLY AS NEEDED 45 capsule 0     No facility-administered encounter medications on file as of 10/26/2022.          Assessment:       1. JENNA (generalized anxiety disorder)    2. Restless leg syndrome    3. Current moderate episode of major depressive disorder without prior episode    4. Primary insomnia           Plan:       JENNA (generalized anxiety disorder)  -     EScitalopram oxalate  (LEXAPRO) 20 MG tablet; Take 1 tablet (20 mg total) by mouth once daily.  Dispense: 90 tablet; Refill: 4  -     Discontinue: temazepam (RESTORIL) 15 mg Cap; TAKE 1 CAPSULE BY MOUTH NIGHTLY AS NEEDED  Dispense: 45 capsule; Refill: 0  -     temazepam (RESTORIL) 15 mg Cap; TAKE 1 CAPSULE BY MOUTH NIGHTLY AS NEEDED  Dispense: 45 capsule; Refill: 0  -     busPIRone (BUSPAR) 5 MG Tab; Take 2 tablets (10 mg total) by mouth 2 (two) times daily.  Dispense: 180 tablet; Refill: 3    Restless leg syndrome  -     gabapentin (NEURONTIN) 300 MG capsule; Take 1 capsule (300 mg total) by mouth every evening.  Dispense: 90 capsule; Refill: 4    Current moderate episode of major depressive disorder without prior episode  -     EScitalopram oxalate (LEXAPRO) 20 MG tablet; Take 1 tablet (20 mg total) by mouth once daily.  Dispense: 90 tablet; Refill: 4    Primary insomnia  -     Discontinue: temazepam (RESTORIL) 15 mg Cap; TAKE 1 CAPSULE BY MOUTH NIGHTLY AS NEEDED  Dispense: 45 capsule; Refill: 0  -     temazepam (RESTORIL) 15 mg Cap; TAKE 1 CAPSULE BY MOUTH NIGHTLY AS NEEDED  Dispense: 45 capsule; Refill: 0           Anxiety/depression:   - patient has been having significant depression anxiety, has not been able to remain at work most days.  States that she just does not want to be there, will leave early from work.  When she gets home typically will go to sleep, that have difficulty sleeping at night.   - was taking Lexapro/escitalopram for several years, but was discontinued in July due to perceived lack of affect and switch to Wellbutrin.  Did not tolerate Wellbutrin well.  In retrospect feels that the citalopram may have been quite effective.   - resume escitalopram (Lexapro) 20 mg daily, start on ½ tab for a week, then full tab after.   - continue Buspar 10mg twice daily for anxiety.   - would recommend getting an appointment with a counselor, has been given contact number for [facility] counselors.    Insomnia:    - patient  has used temazepam for sleep aid, had previously been using every 2nd or 3rd night, but more recently is using every night.  Was advised that regularly uses medication can make it less effective.  Would recommend trying to alternate with nights using melatonin.    - Additionally may find that she is better able to get sleep at night if avoiding these afternoon naps that she has been having due to her depression.  Hopefully with re-starting Lexapro or fine she can avoid the evening naps and get better sleep at night.    Restless Leg:   - also using Gabapentin 300mg nightly to help with sleep due to restless leg s/s.

## 2023-01-12 ENCOUNTER — OFFICE VISIT (OUTPATIENT)
Dept: PRIMARY CARE CLINIC | Facility: CLINIC | Age: 68
End: 2023-01-12
Payer: COMMERCIAL

## 2023-01-12 VITALS
OXYGEN SATURATION: 97 % | HEIGHT: 61 IN | HEART RATE: 82 BPM | SYSTOLIC BLOOD PRESSURE: 120 MMHG | TEMPERATURE: 98 F | DIASTOLIC BLOOD PRESSURE: 72 MMHG | RESPIRATION RATE: 18 BRPM | BODY MASS INDEX: 33.41 KG/M2 | WEIGHT: 176.94 LBS

## 2023-01-12 DIAGNOSIS — F32.1 CURRENT MODERATE EPISODE OF MAJOR DEPRESSIVE DISORDER WITHOUT PRIOR EPISODE: Primary | ICD-10-CM

## 2023-01-12 DIAGNOSIS — F51.01 PRIMARY INSOMNIA: ICD-10-CM

## 2023-01-12 DIAGNOSIS — F41.1 GAD (GENERALIZED ANXIETY DISORDER): ICD-10-CM

## 2023-01-12 PROCEDURE — 1159F PR MEDICATION LIST DOCUMENTED IN MEDICAL RECORD: ICD-10-PCS | Mod: CPTII,S$GLB,, | Performed by: STUDENT IN AN ORGANIZED HEALTH CARE EDUCATION/TRAINING PROGRAM

## 2023-01-12 PROCEDURE — 1101F PT FALLS ASSESS-DOCD LE1/YR: CPT | Mod: CPTII,S$GLB,, | Performed by: STUDENT IN AN ORGANIZED HEALTH CARE EDUCATION/TRAINING PROGRAM

## 2023-01-12 PROCEDURE — 3288F PR FALLS RISK ASSESSMENT DOCUMENTED: ICD-10-PCS | Mod: CPTII,S$GLB,, | Performed by: STUDENT IN AN ORGANIZED HEALTH CARE EDUCATION/TRAINING PROGRAM

## 2023-01-12 PROCEDURE — 3288F FALL RISK ASSESSMENT DOCD: CPT | Mod: CPTII,S$GLB,, | Performed by: STUDENT IN AN ORGANIZED HEALTH CARE EDUCATION/TRAINING PROGRAM

## 2023-01-12 PROCEDURE — 1159F MED LIST DOCD IN RCRD: CPT | Mod: CPTII,S$GLB,, | Performed by: STUDENT IN AN ORGANIZED HEALTH CARE EDUCATION/TRAINING PROGRAM

## 2023-01-12 PROCEDURE — 1101F PR PT FALLS ASSESS DOC 0-1 FALLS W/OUT INJ PAST YR: ICD-10-PCS | Mod: CPTII,S$GLB,, | Performed by: STUDENT IN AN ORGANIZED HEALTH CARE EDUCATION/TRAINING PROGRAM

## 2023-01-12 PROCEDURE — 3008F BODY MASS INDEX DOCD: CPT | Mod: CPTII,S$GLB,, | Performed by: STUDENT IN AN ORGANIZED HEALTH CARE EDUCATION/TRAINING PROGRAM

## 2023-01-12 PROCEDURE — 3008F PR BODY MASS INDEX (BMI) DOCUMENTED: ICD-10-PCS | Mod: CPTII,S$GLB,, | Performed by: STUDENT IN AN ORGANIZED HEALTH CARE EDUCATION/TRAINING PROGRAM

## 2023-01-12 PROCEDURE — 99999 PR PBB SHADOW E&M-EST. PATIENT-LVL IV: ICD-10-PCS | Mod: PBBFAC,,, | Performed by: STUDENT IN AN ORGANIZED HEALTH CARE EDUCATION/TRAINING PROGRAM

## 2023-01-12 PROCEDURE — 99213 OFFICE O/P EST LOW 20 MIN: CPT | Mod: S$GLB,,, | Performed by: STUDENT IN AN ORGANIZED HEALTH CARE EDUCATION/TRAINING PROGRAM

## 2023-01-12 PROCEDURE — 99213 PR OFFICE/OUTPT VISIT, EST, LEVL III, 20-29 MIN: ICD-10-PCS | Mod: S$GLB,,, | Performed by: STUDENT IN AN ORGANIZED HEALTH CARE EDUCATION/TRAINING PROGRAM

## 2023-01-12 PROCEDURE — 3078F DIAST BP <80 MM HG: CPT | Mod: CPTII,S$GLB,, | Performed by: STUDENT IN AN ORGANIZED HEALTH CARE EDUCATION/TRAINING PROGRAM

## 2023-01-12 PROCEDURE — 99999 PR PBB SHADOW E&M-EST. PATIENT-LVL IV: CPT | Mod: PBBFAC,,, | Performed by: STUDENT IN AN ORGANIZED HEALTH CARE EDUCATION/TRAINING PROGRAM

## 2023-01-12 PROCEDURE — 3074F SYST BP LT 130 MM HG: CPT | Mod: CPTII,S$GLB,, | Performed by: STUDENT IN AN ORGANIZED HEALTH CARE EDUCATION/TRAINING PROGRAM

## 2023-01-12 PROCEDURE — 3074F PR MOST RECENT SYSTOLIC BLOOD PRESSURE < 130 MM HG: ICD-10-PCS | Mod: CPTII,S$GLB,, | Performed by: STUDENT IN AN ORGANIZED HEALTH CARE EDUCATION/TRAINING PROGRAM

## 2023-01-12 PROCEDURE — 1126F PR PAIN SEVERITY QUANTIFIED, NO PAIN PRESENT: ICD-10-PCS | Mod: CPTII,S$GLB,, | Performed by: STUDENT IN AN ORGANIZED HEALTH CARE EDUCATION/TRAINING PROGRAM

## 2023-01-12 PROCEDURE — 3078F PR MOST RECENT DIASTOLIC BLOOD PRESSURE < 80 MM HG: ICD-10-PCS | Mod: CPTII,S$GLB,, | Performed by: STUDENT IN AN ORGANIZED HEALTH CARE EDUCATION/TRAINING PROGRAM

## 2023-01-12 PROCEDURE — 1126F AMNT PAIN NOTED NONE PRSNT: CPT | Mod: CPTII,S$GLB,, | Performed by: STUDENT IN AN ORGANIZED HEALTH CARE EDUCATION/TRAINING PROGRAM

## 2023-01-12 RX ORDER — BUSPIRONE HYDROCHLORIDE 10 MG/1
10 TABLET ORAL 3 TIMES DAILY
Qty: 90 TABLET | Refills: 3 | Status: SHIPPED | OUTPATIENT
Start: 2023-01-12 | End: 2023-05-17

## 2023-01-12 RX ORDER — TEMAZEPAM 15 MG/1
CAPSULE ORAL
Qty: 45 CAPSULE | Refills: 0 | Status: SHIPPED | OUTPATIENT
Start: 2023-02-15 | End: 2023-05-17

## 2023-01-12 NOTE — PATIENT INSTRUCTIONS
Anxiety/Depression:   - patient has been taking escitalopram 20 mg daily and tolerating well.  Additionally taking BuSpar 20 mg in the morning, states this has been helpful but seems to wear off mid day.   - continue on escitalopram/Lexapro 20 mg daily.   - discussed that we can increase BuSpar dose, but cannot taken all at once.  Will take Buspar 20 mg in the morning and an additional 10 mg at lunch.     Insomnia:   - patient has been taking melatonin 10 mg nightly.   - additionally is taking temazepam/Restoril  15mg every other night.    - patient states that she is able to get to sleep all night, but she does get to sleep fast on the night she uses the temazepam.  She understands the concern about weaning affective she takes medication daily, is agreement to only use the medicine every 2nd or 3rd night at this time.

## 2023-01-12 NOTE — PROGRESS NOTES
"Subjective:           Patient ID: Verena Stephenson is a 67 y.o. female who presents today with a chief complaint of f/u appt.    Chief Complaint:   Anxiety and Depression      History of Present Illness:    68yo female presenting for f/u on anxiety and mood.  Has also had some head congestion that is not in throat yet, but is progressing.    Plan from appt on 10/26/22:  "Anxiety/depression:              - patient has been having significant depression anxiety, has not been able to remain at work most days.  States that she just does not want to be there, will leave early from work.  When she gets home typically will go to sleep, that have difficulty sleeping at night.              - was taking Lexapro/escitalopram for several years, but was discontinued in July due to perceived lack of affect and switch to Wellbutrin.  Did not tolerate Wellbutrin well.  In retrospect feels that the citalopram may have been quite effective.              - resume escitalopram (Lexapro) 20 mg daily, start on ½ tab for a week, then full tab after.              - continue Buspar 10mg twice daily for anxiety.              - would recommend getting an appointment with a counselor, has been given contact number for [facility] counselors.     Insomnia:               - patient has used temazepam for sleep aid, had previously been using every 2nd or 3rd night, but more recently is using every night.  Was advised that regularly uses medication can make it less effective.  Would recommend trying to alternate with nights using melatonin.    - Additionally may find that she is better able to get sleep at night if avoiding these afternoon naps that she has been having due to her depression.  Hopefully with re-starting Lexapro or fine she can avoid the evening naps and get better sleep at night.     Restless Leg:              - also using Gabapentin 300mg nightly to help with sleep"     Has been taking Temazepam at night.  Has also been taking " "Escitalopram 20mg daily and is taking Buspar 20mg daily all in the AM.    States that this is helpful for a time, but between 12 and 3PM starts to feel herself spiraling and wants to go to sleep.             Review of Systems   Constitutional:  Positive for fatigue. Negative for activity change, fever and unexpected weight change.   HENT:  Negative for congestion, nosebleeds, sinus pressure and sneezing.    Respiratory:  Negative for cough, shortness of breath and wheezing.    Cardiovascular:  Negative for chest pain, palpitations and leg swelling.   Gastrointestinal:  Positive for constipation and nausea. Negative for abdominal distention and diarrhea.   Genitourinary:  Negative for difficulty urinating and dysuria.   Musculoskeletal:  Negative for back pain and gait problem.   Skin:  Negative for pallor and rash.   Neurological:  Positive for headaches. Negative for weakness and numbness.   Psychiatric/Behavioral:  Positive for agitation, dysphoric mood and sleep disturbance (sleeping after work daily, then poor sleep at night.). The patient is nervous/anxious.          Objective:        Vitals:    01/12/23 1448   BP: 120/72   BP Location: Right arm   Patient Position: Sitting   BP Method: Medium (Manual)   Pulse: 82   Resp: 18   Temp: 97.5 °F (36.4 °C)   TempSrc: Temporal   SpO2: 97%   Weight: 80.3 kg (176 lb 14.7 oz)   Height: 5' 1" (1.549 m)       Body mass index is 33.43 kg/m².      Physical Exam  Constitutional:       General: She is not in acute distress.     Appearance: Normal appearance. She is obese.      Comments: As per BMI.   HENT:      Head: Normocephalic.      Right Ear: External ear normal.      Left Ear: External ear normal.      Mouth/Throat:      Mouth: Mucous membranes are moist.   Eyes:      Extraocular Movements: Extraocular movements intact.      Conjunctiva/sclera: Conjunctivae normal.   Cardiovascular:      Rate and Rhythm: Normal rate.      Heart sounds:     No gallop.   Pulmonary:      " Effort: Pulmonary effort is normal. No respiratory distress.      Breath sounds: Normal breath sounds.   Abdominal:      General: Abdomen is flat. Bowel sounds are normal. There is no distension.      Palpations: Abdomen is soft.   Musculoskeletal:         General: No swelling or deformity.      Right lower leg: No edema.      Left lower leg: No edema.   Lymphadenopathy:      Cervical: No cervical adenopathy.   Skin:     General: Skin is warm.      Capillary Refill: Capillary refill takes less than 2 seconds.      Coloration: Skin is not jaundiced.   Neurological:      General: No focal deficit present.      Mental Status: She is alert and oriented to person, place, and time.      Motor: No weakness.   Psychiatric:      Comments: Less upset today vs last appt, but still showing anxiety about her situation.           Lab Results   Component Value Date     09/21/2022    K 3.4 (L) 09/21/2022     09/21/2022    CO2 31 09/21/2022    BUN 13 09/21/2022    CREATININE 0.79 09/21/2022     Lab Results   Component Value Date    HGBA1C 5.4 09/21/2022     No results found for: BNP, BNPTRIAGEBLO    Lab Results   Component Value Date    WBC 6.9 09/21/2022    HGB 14.8 09/21/2022    HCT 45.4 (H) 09/21/2022     09/21/2022     Lab Results   Component Value Date    CHOL 180 09/21/2022    HDL 44 (L) 09/21/2022    LDLCALC 115 (H) 09/21/2022    TRIG 104 09/21/2022          Current Outpatient Medications:     amLODIPine (NORVASC) 5 MG tablet, Take 1 tablet (5 mg total) by mouth once daily., Disp: 90 tablet, Rfl: 0    B-complex with vitamin C (Z-BEC OR EQUIV) tablet, Take 1 tablet by mouth once daily., Disp: , Rfl:     ergocalciferol (VITAMIN D2) 50,000 unit Cap, Take 1 capsule (50,000 Units total) by mouth every 7 days., Disp: 12 capsule, Rfl: 0    EScitalopram oxalate (LEXAPRO) 20 MG tablet, Take 1 tablet (20 mg total) by mouth once daily., Disp: 90 tablet, Rfl: 4    gabapentin (NEURONTIN) 300 MG capsule, Take 1 capsule  (300 mg total) by mouth every evening., Disp: 90 capsule, Rfl: 4    melatonin 10 mg Tab, Take by mouth., Disp: , Rfl:     meloxicam (MOBIC) 15 MG tablet, TAKE 1 TABLET BY MOUTH DAILY AS NEEDED FOR PAIN., Disp: 30 tablet, Rfl: 1    omeprazole (PRILOSEC) 40 MG capsule, Take 1 capsule (40 mg total) by mouth every morning., Disp: 90 capsule, Rfl: 0    busPIRone (BUSPAR) 10 MG tablet, Take 1 tablet (10 mg total) by mouth 3 (three) times daily., Disp: 90 tablet, Rfl: 3    [START ON 2/15/2023] temazepam (RESTORIL) 15 mg Cap, Take 1 capsule by mouth nightly as needed., Disp: 45 capsule, Rfl: 0     Outpatient Encounter Medications as of 1/12/2023   Medication Sig Dispense Refill    amLODIPine (NORVASC) 5 MG tablet Take 1 tablet (5 mg total) by mouth once daily. 90 tablet 0    B-complex with vitamin C (Z-BEC OR EQUIV) tablet Take 1 tablet by mouth once daily.      ergocalciferol (VITAMIN D2) 50,000 unit Cap Take 1 capsule (50,000 Units total) by mouth every 7 days. 12 capsule 0    EScitalopram oxalate (LEXAPRO) 20 MG tablet Take 1 tablet (20 mg total) by mouth once daily. 90 tablet 4    gabapentin (NEURONTIN) 300 MG capsule Take 1 capsule (300 mg total) by mouth every evening. 90 capsule 4    melatonin 10 mg Tab Take by mouth.      meloxicam (MOBIC) 15 MG tablet TAKE 1 TABLET BY MOUTH DAILY AS NEEDED FOR PAIN. 30 tablet 1    omeprazole (PRILOSEC) 40 MG capsule Take 1 capsule (40 mg total) by mouth every morning. 90 capsule 0    [DISCONTINUED] busPIRone (BUSPAR) 5 MG Tab Take 2 tablets (10 mg total) by mouth 2 (two) times daily. 180 tablet 3    [DISCONTINUED] temazepam (RESTORIL) 15 mg Cap TAKE 1 CAPSULE BY MOUTH NIGHTLY AS NEEDED 45 capsule 0    busPIRone (BUSPAR) 10 MG tablet Take 1 tablet (10 mg total) by mouth 3 (three) times daily. 90 tablet 3    [START ON 2/15/2023] temazepam (RESTORIL) 15 mg Cap Take 1 capsule by mouth nightly as needed. 45 capsule 0     No facility-administered encounter medications on file as of  1/12/2023.          Assessment:       1. Current moderate episode of major depressive disorder without prior episode    2. JENNA (generalized anxiety disorder)    3. Primary insomnia           Plan:       Current moderate episode of major depressive disorder without prior episode    JENNA (generalized anxiety disorder)  -     busPIRone (BUSPAR) 10 MG tablet; Take 1 tablet (10 mg total) by mouth 3 (three) times daily.  Dispense: 90 tablet; Refill: 3  -     temazepam (RESTORIL) 15 mg Cap; Take 1 capsule by mouth nightly as needed.  Dispense: 45 capsule; Refill: 0    Primary insomnia  -     temazepam (RESTORIL) 15 mg Cap; Take 1 capsule by mouth nightly as needed.  Dispense: 45 capsule; Refill: 0               Anxiety/Depression:   - patient has been taking escitalopram 20 mg daily and tolerating well.  Additionally taking BuSpar 20 mg in the morning, states this has been helpful but seems to wear off mid day.   - continue on escitalopram/Lexapro 20 mg daily.   - discussed that we can increase BuSpar dose, but cannot taken all at once.  Will take Buspar 20 mg in the morning and an additional 10 mg at lunch.     Insomnia:   - patient has been taking melatonin 10 mg nightly.   - additionally is taking temazepam/Restoril  15mg every other night.    - patient states that she is able to get to sleep all night, but she does get to sleep fast on the night she uses the temazepam.  She understands the concern about weaning affective she takes medication daily, is agreement to only use the medicine every 2nd or 3rd night at this time.

## 2023-01-18 ENCOUNTER — PATIENT MESSAGE (OUTPATIENT)
Dept: PRIMARY CARE CLINIC | Facility: CLINIC | Age: 68
End: 2023-01-18
Payer: COMMERCIAL

## 2023-04-03 ENCOUNTER — TELEPHONE (OUTPATIENT)
Dept: PRIMARY CARE CLINIC | Facility: CLINIC | Age: 68
End: 2023-04-03
Payer: COMMERCIAL

## 2023-04-03 NOTE — TELEPHONE ENCOUNTER
----- Message from Alisa Chao sent at 4/3/2023  9:26 AM CDT -----  Please give patient a call she is very concerned about the medicine she is taken. [phone]

## 2023-04-03 NOTE — TELEPHONE ENCOUNTER
Patient complaining Buspar not working.  She is home again from work, can't stop crying and very anxious.  She would like a different medication.

## 2023-04-04 ENCOUNTER — OFFICE VISIT (OUTPATIENT)
Dept: PRIMARY CARE CLINIC | Facility: CLINIC | Age: 68
End: 2023-04-04
Payer: COMMERCIAL

## 2023-04-04 DIAGNOSIS — F41.1 GAD (GENERALIZED ANXIETY DISORDER): ICD-10-CM

## 2023-04-04 DIAGNOSIS — F32.1 CURRENT MODERATE EPISODE OF MAJOR DEPRESSIVE DISORDER WITHOUT PRIOR EPISODE: Primary | ICD-10-CM

## 2023-04-04 PROCEDURE — 99214 PR OFFICE/OUTPT VISIT, EST, LEVL IV, 30-39 MIN: ICD-10-PCS | Mod: 95,,, | Performed by: STUDENT IN AN ORGANIZED HEALTH CARE EDUCATION/TRAINING PROGRAM

## 2023-04-04 PROCEDURE — 99214 OFFICE O/P EST MOD 30 MIN: CPT | Mod: 95,,, | Performed by: STUDENT IN AN ORGANIZED HEALTH CARE EDUCATION/TRAINING PROGRAM

## 2023-04-04 RX ORDER — ALPRAZOLAM 0.5 MG/1
TABLET ORAL
Qty: 8 TABLET | Refills: 2 | Status: SHIPPED | OUTPATIENT
Start: 2023-04-04 | End: 2023-05-17 | Stop reason: SDUPTHER

## 2023-04-04 RX ORDER — SERTRALINE HYDROCHLORIDE 50 MG/1
150 TABLET, FILM COATED ORAL DAILY
Qty: 90 TABLET | Refills: 11 | Status: SHIPPED | OUTPATIENT
Start: 2023-04-04 | End: 2023-11-30

## 2023-04-04 NOTE — PROGRESS NOTES
The patient location is: [place]  The chief complaint leading to consultation is: anxiety    Visit type: audiovisual    Face to Face time with patient: 30  35 minutes of total time spent on the encounter, which includes face to face time and non-face to face time preparing to see the patient (eg, review of tests), Obtaining and/or reviewing separately obtained history, Documenting clinical information in the electronic or other health record, Independently interpreting results (not separately reported) and communicating results to the patient/family/caregiver, or Care coordination (not separately reported).         Each patient to whom he or she provides medical services by telemedicine is:  (1) informed of the relationship between the physician and patient and the respective role of any other health care provider with respect to management of the patient; and (2) notified that he or she may decline to receive medical services by telemedicine and may withdraw from such care at any time.    Notes:       68yo female presenting for f/u on anxiety.     has not been able to stay work for for more than a couple days per week.    Is interested on getting off the Buspar and onto an alternative if possible.    States on Feb 28th, her son's girlfriend had a baby and she is having post-partum depression; but patient not have new stressors.    Has used Lexapro and Prozac had both eventually lost effect.    Wellbutrin was also tried and not tolerated.     States that her daughter has been giving her Xanax before was taking a 1/2 tab about once a month and that seemed to help.   has not been on any for about 5-6 months now.     Physical Exam  Constitutional:       General: She is not in acute distress.     Appearance: Normal appearance.   HENT:      Right Ear: External ear normal.      Left Ear: External ear normal.      Nose: No rhinorrhea.   Eyes:      Extraocular Movements: Extraocular movements intact.    Pulmonary:      Effort: Pulmonary effort is normal. No respiratory distress.   Neurological:      Mental Status: She is alert and oriented to person, place, and time.   Psychiatric:         Mood and Affect: Mood normal.         Behavior: Behavior normal.       MDD:   - has been tolerating Escitalopram well.   - reviewed other meds and psych hx.  Discussed stressors and coping.  Advised treatment plan and possible counseling.    - continue on Escitalopram.   - state the Sertraline, titrate off Buspar.   - and use Xanax PRN up to 2 times a week.   - f/u 6 weeks in May.    Medication Cross Trituration:    Start taking Sertraline 50mg once daily, and cut Buspar back to 20mg daily, after 2 weeks, increase to Sertraline 100mg daily and take 1/2 a Buspar tab twice daily. Then after 2 more weeks, increase to Sertraline 150mg and stop Buspar.

## 2023-04-04 NOTE — PATIENT INSTRUCTIONS
Depression:   - continue on Escitalopram.   - state the Sertraline, titrate off Buspar.   - and use Xanax PRN up to 2 times a week.    Start taking Sertraline 50mg once daily, and cut Buspar back to 20mg daily, after 2 weeks, increase to Sertraline 100mg daily and take 1/2 a Buspar tab twice daily. Then after 2 more weeks, increase to Sertraline 150mg and stop Buspar.

## 2023-04-21 ENCOUNTER — PATIENT MESSAGE (OUTPATIENT)
Dept: ADMINISTRATIVE | Facility: HOSPITAL | Age: 68
End: 2023-04-21
Payer: COMMERCIAL

## 2023-05-17 ENCOUNTER — OFFICE VISIT (OUTPATIENT)
Dept: PRIMARY CARE CLINIC | Facility: CLINIC | Age: 68
End: 2023-05-17
Payer: COMMERCIAL

## 2023-05-17 VITALS
RESPIRATION RATE: 16 BRPM | TEMPERATURE: 98 F | BODY MASS INDEX: 34.28 KG/M2 | DIASTOLIC BLOOD PRESSURE: 62 MMHG | OXYGEN SATURATION: 95 % | HEIGHT: 61 IN | SYSTOLIC BLOOD PRESSURE: 122 MMHG | HEART RATE: 63 BPM | WEIGHT: 181.56 LBS

## 2023-05-17 DIAGNOSIS — Z00.00 ANNUAL PHYSICAL EXAM: ICD-10-CM

## 2023-05-17 DIAGNOSIS — F32.1 CURRENT MODERATE EPISODE OF MAJOR DEPRESSIVE DISORDER WITHOUT PRIOR EPISODE: ICD-10-CM

## 2023-05-17 DIAGNOSIS — I10 ESSENTIAL HYPERTENSION: Chronic | ICD-10-CM

## 2023-05-17 DIAGNOSIS — F41.1 GAD (GENERALIZED ANXIETY DISORDER): Primary | ICD-10-CM

## 2023-05-17 DIAGNOSIS — G44.209 TENSION HEADACHE: ICD-10-CM

## 2023-05-17 PROCEDURE — 3008F PR BODY MASS INDEX (BMI) DOCUMENTED: ICD-10-PCS | Mod: CPTII,S$GLB,, | Performed by: STUDENT IN AN ORGANIZED HEALTH CARE EDUCATION/TRAINING PROGRAM

## 2023-05-17 PROCEDURE — 3074F PR MOST RECENT SYSTOLIC BLOOD PRESSURE < 130 MM HG: ICD-10-PCS | Mod: CPTII,S$GLB,, | Performed by: STUDENT IN AN ORGANIZED HEALTH CARE EDUCATION/TRAINING PROGRAM

## 2023-05-17 PROCEDURE — 3288F FALL RISK ASSESSMENT DOCD: CPT | Mod: CPTII,S$GLB,, | Performed by: STUDENT IN AN ORGANIZED HEALTH CARE EDUCATION/TRAINING PROGRAM

## 2023-05-17 PROCEDURE — 99214 PR OFFICE/OUTPT VISIT, EST, LEVL IV, 30-39 MIN: ICD-10-PCS | Mod: S$GLB,,, | Performed by: STUDENT IN AN ORGANIZED HEALTH CARE EDUCATION/TRAINING PROGRAM

## 2023-05-17 PROCEDURE — 1101F PT FALLS ASSESS-DOCD LE1/YR: CPT | Mod: CPTII,S$GLB,, | Performed by: STUDENT IN AN ORGANIZED HEALTH CARE EDUCATION/TRAINING PROGRAM

## 2023-05-17 PROCEDURE — 3008F BODY MASS INDEX DOCD: CPT | Mod: CPTII,S$GLB,, | Performed by: STUDENT IN AN ORGANIZED HEALTH CARE EDUCATION/TRAINING PROGRAM

## 2023-05-17 PROCEDURE — 1159F PR MEDICATION LIST DOCUMENTED IN MEDICAL RECORD: ICD-10-PCS | Mod: CPTII,S$GLB,, | Performed by: STUDENT IN AN ORGANIZED HEALTH CARE EDUCATION/TRAINING PROGRAM

## 2023-05-17 PROCEDURE — 99999 PR PBB SHADOW E&M-EST. PATIENT-LVL V: ICD-10-PCS | Mod: PBBFAC,,, | Performed by: STUDENT IN AN ORGANIZED HEALTH CARE EDUCATION/TRAINING PROGRAM

## 2023-05-17 PROCEDURE — 1101F PR PT FALLS ASSESS DOC 0-1 FALLS W/OUT INJ PAST YR: ICD-10-PCS | Mod: CPTII,S$GLB,, | Performed by: STUDENT IN AN ORGANIZED HEALTH CARE EDUCATION/TRAINING PROGRAM

## 2023-05-17 PROCEDURE — 1159F MED LIST DOCD IN RCRD: CPT | Mod: CPTII,S$GLB,, | Performed by: STUDENT IN AN ORGANIZED HEALTH CARE EDUCATION/TRAINING PROGRAM

## 2023-05-17 PROCEDURE — 1125F PR PAIN SEVERITY QUANTIFIED, PAIN PRESENT: ICD-10-PCS | Mod: CPTII,S$GLB,, | Performed by: STUDENT IN AN ORGANIZED HEALTH CARE EDUCATION/TRAINING PROGRAM

## 2023-05-17 PROCEDURE — 3288F PR FALLS RISK ASSESSMENT DOCUMENTED: ICD-10-PCS | Mod: CPTII,S$GLB,, | Performed by: STUDENT IN AN ORGANIZED HEALTH CARE EDUCATION/TRAINING PROGRAM

## 2023-05-17 PROCEDURE — 1125F AMNT PAIN NOTED PAIN PRSNT: CPT | Mod: CPTII,S$GLB,, | Performed by: STUDENT IN AN ORGANIZED HEALTH CARE EDUCATION/TRAINING PROGRAM

## 2023-05-17 PROCEDURE — 3078F DIAST BP <80 MM HG: CPT | Mod: CPTII,S$GLB,, | Performed by: STUDENT IN AN ORGANIZED HEALTH CARE EDUCATION/TRAINING PROGRAM

## 2023-05-17 PROCEDURE — 99214 OFFICE O/P EST MOD 30 MIN: CPT | Mod: S$GLB,,, | Performed by: STUDENT IN AN ORGANIZED HEALTH CARE EDUCATION/TRAINING PROGRAM

## 2023-05-17 PROCEDURE — 99999 PR PBB SHADOW E&M-EST. PATIENT-LVL V: CPT | Mod: PBBFAC,,, | Performed by: STUDENT IN AN ORGANIZED HEALTH CARE EDUCATION/TRAINING PROGRAM

## 2023-05-17 PROCEDURE — 3078F PR MOST RECENT DIASTOLIC BLOOD PRESSURE < 80 MM HG: ICD-10-PCS | Mod: CPTII,S$GLB,, | Performed by: STUDENT IN AN ORGANIZED HEALTH CARE EDUCATION/TRAINING PROGRAM

## 2023-05-17 PROCEDURE — 3074F SYST BP LT 130 MM HG: CPT | Mod: CPTII,S$GLB,, | Performed by: STUDENT IN AN ORGANIZED HEALTH CARE EDUCATION/TRAINING PROGRAM

## 2023-05-17 RX ORDER — AMLODIPINE BESYLATE 5 MG/1
5 TABLET ORAL DAILY
Qty: 90 TABLET | Refills: 3 | Status: SHIPPED | OUTPATIENT
Start: 2023-05-17 | End: 2023-11-30 | Stop reason: SDUPTHER

## 2023-05-17 RX ORDER — BUTALBITAL, ACETAMINOPHEN AND CAFFEINE 50; 325; 40 MG/1; MG/1; MG/1
1 TABLET ORAL 2 TIMES DAILY PRN
Qty: 8 TABLET | Refills: 2 | Status: SHIPPED | OUTPATIENT
Start: 2023-05-17 | End: 2023-06-16

## 2023-05-17 RX ORDER — ALPRAZOLAM 0.5 MG/1
TABLET ORAL
Qty: 8 TABLET | Refills: 2 | Status: SHIPPED | OUTPATIENT
Start: 2023-06-05 | End: 2023-08-22 | Stop reason: SDUPTHER

## 2023-05-17 NOTE — PROGRESS NOTES
Subjective:           Patient ID: Verena Stephenson is a 67 y.o. female who presents today with a chief complaint of f/u anxiety/depression, HA x 2 days.    Chief Complaint:   Follow-up (Depression/anxiety) and Headache (X2 days)      History of Present Illness:      67 y.o. female who presents today with a chief complaint of f/u anxiety/depression, HA x 2 days.    Seen in virtual visit appointment on 4/4/23 and advised to follow this titration.  Was started on sertraline 50 mg daily cut back BuSpar to 20 mg.  After 2 weeks increase sertraline to 100 mg cut BuSpar back to 10 mg daily.  Then after 2 weeks increase sertraline to 150 and stop BuSpar.      Stress and anxiety have been so briana right now cannot say if this has been helpful.  Would like to say on the Sertraline for now and give it time to work.        Headache:  Has had 2 days of headache.  Has been very stressed.   had lipoma removed from under arm, and bled overnight.      Went to the ED and was advised could not do anything.  Has f/u with Dr. Britt tomorrow.     Had pain from temples to shoulders.            Review of Systems   Constitutional:  Positive for fatigue. Negative for activity change, fever and unexpected weight change.   HENT:  Negative for congestion, nosebleeds, sinus pressure and sneezing.    Respiratory:  Negative for cough, shortness of breath and wheezing.    Cardiovascular:  Negative for chest pain, palpitations and leg swelling.   Gastrointestinal:  Positive for constipation and nausea. Negative for abdominal distention and diarrhea.   Genitourinary:  Negative for difficulty urinating and dysuria.   Musculoskeletal:  Negative for back pain and gait problem.   Skin:  Negative for pallor and rash.   Neurological:  Positive for headaches. Negative for weakness and numbness.   Psychiatric/Behavioral:  Positive for agitation, dysphoric mood and sleep disturbance (sleeping after work daily, then poor sleep at night.). The  "patient is nervous/anxious.          Objective:        Vitals:    05/17/23 1503   BP: 122/62   BP Location: Right arm   Patient Position: Sitting   BP Method: Medium (Manual)   Pulse: 63   Resp: 16   Temp: 97.7 °F (36.5 °C)   TempSrc: Temporal   SpO2: 95%   Weight: 82.3 kg (181 lb 8.8 oz)   Height: 5' 1" (1.549 m)       Body mass index is 34.3 kg/m².      Physical Exam  Constitutional:       General: She is not in acute distress.     Appearance: Normal appearance. She is obese.      Comments: As per BMI.   HENT:      Head: Normocephalic.      Right Ear: External ear normal.      Left Ear: External ear normal.      Mouth/Throat:      Mouth: Mucous membranes are moist.   Eyes:      Extraocular Movements: Extraocular movements intact.      Conjunctiva/sclera: Conjunctivae normal.   Cardiovascular:      Rate and Rhythm: Normal rate.      Heart sounds:     No gallop.   Pulmonary:      Effort: Pulmonary effort is normal. No respiratory distress.      Breath sounds: Normal breath sounds.   Abdominal:      General: Abdomen is flat. Bowel sounds are normal. There is no distension.      Palpations: Abdomen is soft.   Musculoskeletal:         General: No swelling or deformity.      Right lower leg: No edema.      Left lower leg: No edema.   Lymphadenopathy:      Cervical: No cervical adenopathy.   Skin:     General: Skin is warm.      Capillary Refill: Capillary refill takes less than 2 seconds.      Coloration: Skin is not jaundiced.   Neurological:      General: No focal deficit present.      Mental Status: She is alert and oriented to person, place, and time.      Motor: No weakness.   Psychiatric:      Comments: Less upset today vs last appt, but still showing anxiety about her situation.           Lab Results   Component Value Date     09/21/2022    K 3.4 (L) 09/21/2022     09/21/2022    CO2 31 09/21/2022    BUN 13 09/21/2022    CREATININE 0.79 09/21/2022     Lab Results   Component Value Date    HGBA1C 5.4 " 09/21/2022     No results found for: BNP, BNPTRIAGEBLO    Lab Results   Component Value Date    WBC 6.9 09/21/2022    HGB 14.8 09/21/2022    HCT 45.4 (H) 09/21/2022     09/21/2022     Lab Results   Component Value Date    CHOL 180 09/21/2022    HDL 44 (L) 09/21/2022    LDLCALC 115 (H) 09/21/2022    TRIG 104 09/21/2022          Current Outpatient Medications:     melatonin 10 mg Tab, Take 1 tablet by mouth nightly as needed., Disp: , Rfl:     meloxicam (MOBIC) 15 MG tablet, TAKE 1 TABLET BY MOUTH DAILY AS NEEDED FOR PAIN., Disp: 30 tablet, Rfl: 1    omeprazole (PRILOSEC) 40 MG capsule, Take 1 capsule (40 mg total) by mouth every morning., Disp: 90 capsule, Rfl: 0    sertraline (ZOLOFT) 50 MG tablet, Take 3 tablets (150 mg total) by mouth once daily., Disp: 90 tablet, Rfl: 11    [START ON 6/5/2023] ALPRAZolam (XANAX) 0.5 MG tablet, Take up to 2 tabs per week.  8 tabs for 1 month supply., Disp: 8 tablet, Rfl: 2    amLODIPine (NORVASC) 5 MG tablet, Take 1 tablet (5 mg total) by mouth once daily., Disp: 90 tablet, Rfl: 3    butalbital-acetaminophen-caffeine -40 mg (FIORICET, ESGIC) -40 mg per tablet, Take 1 tablet by mouth 2 (two) times daily as needed for Pain. Use up to 8 doses monthly., Disp: 8 tablet, Rfl: 2     Outpatient Encounter Medications as of 5/17/2023   Medication Sig Dispense Refill    melatonin 10 mg Tab Take 1 tablet by mouth nightly as needed.      meloxicam (MOBIC) 15 MG tablet TAKE 1 TABLET BY MOUTH DAILY AS NEEDED FOR PAIN. 30 tablet 1    omeprazole (PRILOSEC) 40 MG capsule Take 1 capsule (40 mg total) by mouth every morning. 90 capsule 0    sertraline (ZOLOFT) 50 MG tablet Take 3 tablets (150 mg total) by mouth once daily. 90 tablet 11    [DISCONTINUED] ALPRAZolam (XANAX) 0.5 MG tablet Take up to 2 tabs per week.  8 tabs for 1 month supply. 8 tablet 2    [DISCONTINUED] amLODIPine (NORVASC) 5 MG tablet Take 1 tablet (5 mg total) by mouth once daily. 90 tablet 0    [DISCONTINUED]  busPIRone (BUSPAR) 10 MG tablet Take 1 tablet (10 mg total) by mouth 3 (three) times daily. 90 tablet 3    [DISCONTINUED] temazepam (RESTORIL) 15 mg Cap Take 1 capsule by mouth nightly as needed. 45 capsule 0    [START ON 6/5/2023] ALPRAZolam (XANAX) 0.5 MG tablet Take up to 2 tabs per week.  8 tabs for 1 month supply. 8 tablet 2    amLODIPine (NORVASC) 5 MG tablet Take 1 tablet (5 mg total) by mouth once daily. 90 tablet 3    butalbital-acetaminophen-caffeine -40 mg (FIORICET, ESGIC) -40 mg per tablet Take 1 tablet by mouth 2 (two) times daily as needed for Pain. Use up to 8 doses monthly. 8 tablet 2    [DISCONTINUED] B-complex with vitamin C (Z-BEC OR EQUIV) tablet Take 1 tablet by mouth once daily.      [DISCONTINUED] ergocalciferol (VITAMIN D2) 50,000 unit Cap Take 1 capsule (50,000 Units total) by mouth every 7 days. 12 capsule 0    [DISCONTINUED] EScitalopram oxalate (LEXAPRO) 20 MG tablet Take 1 tablet (20 mg total) by mouth once daily. 90 tablet 4    [DISCONTINUED] gabapentin (NEURONTIN) 300 MG capsule Take 1 capsule (300 mg total) by mouth every evening. 90 capsule 4     No facility-administered encounter medications on file as of 5/17/2023.          Assessment:       1. JENNA (generalized anxiety disorder)    2. Current moderate episode of major depressive disorder without prior episode    3. Tension headache    4. Essential hypertension    5. Annual physical exam           Plan:       JENNA (generalized anxiety disorder)  -     Ambulatory referral/consult to Primary Care Behavioral Health (Non-Opioids); Future; Expected date: 05/24/2023  -     ALPRAZolam (XANAX) 0.5 MG tablet; Take up to 2 tabs per week.  8 tabs for 1 month supply.  Dispense: 8 tablet; Refill: 2  -     CBC Auto Differential; Future; Expected date: 08/07/2023  -     Comprehensive Metabolic Panel; Future; Expected date: 08/07/2023  -     Lipid Panel; Future; Expected date: 08/07/2023  -     TSH; Future; Expected date:  08/07/2023    Current moderate episode of major depressive disorder without prior episode  -     Ambulatory referral/consult to Primary Care Behavioral Health (Non-Opioids); Future; Expected date: 05/24/2023  -     ALPRAZolam (XANAX) 0.5 MG tablet; Take up to 2 tabs per week.  8 tabs for 1 month supply.  Dispense: 8 tablet; Refill: 2    Tension headache  -     butalbital-acetaminophen-caffeine -40 mg (FIORICET, ESGIC) -40 mg per tablet; Take 1 tablet by mouth 2 (two) times daily as needed for Pain. Use up to 8 doses monthly.  Dispense: 8 tablet; Refill: 2    Essential hypertension  -     amLODIPine (NORVASC) 5 MG tablet; Take 1 tablet (5 mg total) by mouth once daily.  Dispense: 90 tablet; Refill: 3  -     CBC Auto Differential; Future; Expected date: 08/07/2023  -     Comprehensive Metabolic Panel; Future; Expected date: 08/07/2023  -     Lipid Panel; Future; Expected date: 08/07/2023  -     TSH; Future; Expected date: 08/07/2023    Annual physical exam  -     CBC Auto Differential; Future; Expected date: 08/07/2023  -     Comprehensive Metabolic Panel; Future; Expected date: 08/07/2023  -     Lipid Panel; Future; Expected date: 08/07/2023  -     TSH; Future; Expected date: 08/07/2023             JENNA/Major Depression:   - patient with continued and progressive anxiety with recent stressors.   - has titrated off Buspar.   - started on Sertraline 150mg daily and tolerating well.    - using Xanax PRN for anxiety.    HTN:   - BP controlled at this time, will continue on current meds at this time.     Headache:   - patient headache occurring recurrently. Affecting head from temples down neck bilaterally and into shoulders.    - some relief from Meloxicam, but not adequate at this time.   - advised can attempt use of Fioricet for headache as needed but not on a daily basis, only up to 8 times a month.  Also advise to try relaxation, neck/shoulder stretches, deep breathing, and massage.

## 2023-05-18 ENCOUNTER — TELEPHONE (OUTPATIENT)
Dept: BEHAVIORAL HEALTH | Facility: CLINIC | Age: 68
End: 2023-05-18
Payer: COMMERCIAL

## 2023-05-18 ENCOUNTER — PATIENT MESSAGE (OUTPATIENT)
Dept: BEHAVIORAL HEALTH | Facility: CLINIC | Age: 68
End: 2023-05-18
Payer: COMMERCIAL

## 2023-05-18 NOTE — PATIENT INSTRUCTIONS
JENNA/Major Depression:   - patient with continued and progressive anxiety with recent stressors.   - has titrated off Buspar.   - started on Sertraline 150mg daily and tolerating well.    - using Xanax PRN for anxiety.    HTN:   - BP controlled at this time, will continue on current meds at this time.     Headache:   - patient headache occurring recurrently. Affecting head from temples down neck bilaterally and into shoulders.    - some relief from Meloxicam, but not adequate at this time.   - advised can attempt use of Fioricet for headache as needed but not on a daily basis, only up to 8 times a month.  Also advise to try relaxation, neck/shoulder stretches, deep breathing, and massage.

## 2023-05-18 NOTE — PROGRESS NOTES
Contacted patient no answer left VM.  Sent patient portal message with assessment with request to call for visit.

## 2023-05-19 ENCOUNTER — TELEPHONE (OUTPATIENT)
Dept: BEHAVIORAL HEALTH | Facility: CLINIC | Age: 68
End: 2023-05-19
Payer: COMMERCIAL

## 2023-05-23 ENCOUNTER — TELEPHONE (OUTPATIENT)
Dept: BEHAVIORAL HEALTH | Facility: CLINIC | Age: 68
End: 2023-05-23
Payer: COMMERCIAL

## 2023-05-23 ENCOUNTER — PATIENT MESSAGE (OUTPATIENT)
Dept: BEHAVIORAL HEALTH | Facility: CLINIC | Age: 68
End: 2023-05-23
Payer: COMMERCIAL

## 2023-05-23 NOTE — PROGRESS NOTES
Contacted patient no answer left VM.  3rd attempt. Sent pt portal message to call to madhu or have PCP to refer again if now is not a good time to begin BHI.

## 2023-06-03 ENCOUNTER — PATIENT MESSAGE (OUTPATIENT)
Dept: BEHAVIORAL HEALTH | Facility: CLINIC | Age: 68
End: 2023-06-03
Payer: COMMERCIAL

## 2023-06-03 ENCOUNTER — PATIENT MESSAGE (OUTPATIENT)
Dept: PRIMARY CARE CLINIC | Facility: CLINIC | Age: 68
End: 2023-06-03
Payer: COMMERCIAL

## 2023-08-03 ENCOUNTER — PATIENT MESSAGE (OUTPATIENT)
Dept: BEHAVIORAL HEALTH | Facility: CLINIC | Age: 68
End: 2023-08-03
Payer: COMMERCIAL

## 2023-08-03 ENCOUNTER — PATIENT MESSAGE (OUTPATIENT)
Dept: PRIMARY CARE CLINIC | Facility: CLINIC | Age: 68
End: 2023-08-03
Payer: COMMERCIAL

## 2023-08-03 ENCOUNTER — TELEPHONE (OUTPATIENT)
Dept: BEHAVIORAL HEALTH | Facility: CLINIC | Age: 68
End: 2023-08-03
Payer: COMMERCIAL

## 2023-08-03 DIAGNOSIS — F32.1 CURRENT MODERATE EPISODE OF MAJOR DEPRESSIVE DISORDER WITHOUT PRIOR EPISODE: ICD-10-CM

## 2023-08-03 DIAGNOSIS — F41.1 GAD (GENERALIZED ANXIETY DISORDER): ICD-10-CM

## 2023-08-03 RX ORDER — SERTRALINE HYDROCHLORIDE 50 MG/1
150 TABLET, FILM COATED ORAL DAILY
Qty: 90 TABLET | Refills: 11 | OUTPATIENT
Start: 2023-08-03 | End: 2024-08-02

## 2023-08-03 NOTE — TELEPHONE ENCOUNTER
No care due was identified.  [facility] Embedded Care Due Messages. Reference number: [id number].   8/03/2023 7:54:24 AM CDT

## 2023-08-03 NOTE — PROGRESS NOTES
CHW received intake assessments via patient portal from patient referred by Dr. DWAIN Teixeira on 5/17/2023.  CHW reached out to pt, phone rang and was immediately disconnected.  CHW sent pt portal message with appt times to schedule new pt appt with Donnie Pak LPC. Patient returned call. Pt said she return call, I asked if she wanted to schedule BHI visit. Pt replied it would cost her $50 and she doesn't have 50 cents.  Unable to schedule, asked pt if she wants community resources she said yes to portal, Sent resources and financial assistance phone number at [facility] at [phone].     independent/needs device/rolling walker

## 2023-08-08 ENCOUNTER — PATIENT OUTREACH (OUTPATIENT)
Dept: ADMINISTRATIVE | Facility: HOSPITAL | Age: 68
End: 2023-08-08
Payer: COMMERCIAL

## 2023-08-08 NOTE — PROGRESS NOTES
Health Maintenance Due   Topic Date Due    TETANUS VACCINE  06/15/2017    COVID-19 Vaccine (3 - Moderna series) 05/19/2021    Colorectal Cancer Screening  02/12/2023        Chart review done.    updated.   Immunizations reviewed & updated.   Care Everywhere updated.

## 2023-08-13 LAB
ALBUMIN SERPL-MCNC: 4.2 G/DL (ref 3.6–5.1)
ALBUMIN/GLOB SERPL: 1.7 (CALC) (ref 1–2.5)
ALP SERPL-CCNC: 78 U/L (ref 37–153)
ALT SERPL-CCNC: 15 U/L (ref 6–29)
AST SERPL-CCNC: 14 U/L (ref 10–35)
BASOPHILS # BLD AUTO: 27 CELLS/UL (ref 0–200)
BASOPHILS NFR BLD AUTO: 0.3 %
BILIRUB SERPL-MCNC: 0.5 MG/DL (ref 0.2–1.2)
BUN SERPL-MCNC: 9 MG/DL (ref 7–25)
BUN/CREAT SERPL: ABNORMAL (CALC) (ref 6–22)
CALCIUM SERPL-MCNC: 9.2 MG/DL (ref 8.6–10.4)
CHLORIDE SERPL-SCNC: 103 MMOL/L (ref 98–110)
CHOLEST SERPL-MCNC: 184 MG/DL
CHOLEST/HDLC SERPL: 3.5 (CALC)
CO2 SERPL-SCNC: 29 MMOL/L (ref 20–32)
CREAT SERPL-MCNC: 0.78 MG/DL (ref 0.5–1.05)
EGFR: 84 ML/MIN/1.73M2
EOSINOPHIL # BLD AUTO: 36 CELLS/UL (ref 15–500)
EOSINOPHIL NFR BLD AUTO: 0.4 %
ERYTHROCYTE [DISTWIDTH] IN BLOOD BY AUTOMATED COUNT: 12.6 % (ref 11–15)
GLOBULIN SER CALC-MCNC: 2.5 G/DL (CALC) (ref 1.9–3.7)
GLUCOSE SERPL-MCNC: 110 MG/DL (ref 65–99)
HCT VFR BLD AUTO: 48.2 % (ref 35–45)
HDLC SERPL-MCNC: 53 MG/DL
HGB BLD-MCNC: 15.4 G/DL (ref 11.7–15.5)
LDLC SERPL CALC-MCNC: 113 MG/DL (CALC)
LYMPHOCYTES # BLD AUTO: 2243 CELLS/UL (ref 850–3900)
LYMPHOCYTES NFR BLD AUTO: 25.2 %
MCH RBC QN AUTO: 29.5 PG (ref 27–33)
MCHC RBC AUTO-ENTMCNC: 32 G/DL (ref 32–36)
MCV RBC AUTO: 92.3 FL (ref 80–100)
MONOCYTES # BLD AUTO: 641 CELLS/UL (ref 200–950)
MONOCYTES NFR BLD AUTO: 7.2 %
NEUTROPHILS # BLD AUTO: 5954 CELLS/UL (ref 1500–7800)
NEUTROPHILS NFR BLD AUTO: 66.9 %
NONHDLC SERPL-MCNC: 131 MG/DL (CALC)
PLATELET # BLD AUTO: 307 THOUSAND/UL (ref 140–400)
PMV BLD REES-ECKER: 11.5 FL (ref 7.5–12.5)
POTASSIUM SERPL-SCNC: 3.8 MMOL/L (ref 3.5–5.3)
PROT SERPL-MCNC: 6.7 G/DL (ref 6.1–8.1)
RBC # BLD AUTO: 5.22 MILLION/UL (ref 3.8–5.1)
SODIUM SERPL-SCNC: 138 MMOL/L (ref 135–146)
TRIGL SERPL-MCNC: 85 MG/DL
TSH SERPL-ACNC: 2.77 MIU/L (ref 0.4–4.5)
WBC # BLD AUTO: 8.9 THOUSAND/UL (ref 3.8–10.8)

## 2023-08-22 ENCOUNTER — OFFICE VISIT (OUTPATIENT)
Dept: PRIMARY CARE CLINIC | Facility: CLINIC | Age: 68
End: 2023-08-22
Payer: COMMERCIAL

## 2023-08-22 VITALS
RESPIRATION RATE: 16 BRPM | WEIGHT: 182.75 LBS | OXYGEN SATURATION: 95 % | BODY MASS INDEX: 34.5 KG/M2 | TEMPERATURE: 98 F | HEART RATE: 62 BPM | HEIGHT: 61 IN | DIASTOLIC BLOOD PRESSURE: 68 MMHG | SYSTOLIC BLOOD PRESSURE: 122 MMHG

## 2023-08-22 DIAGNOSIS — F32.1 CURRENT MODERATE EPISODE OF MAJOR DEPRESSIVE DISORDER WITHOUT PRIOR EPISODE: ICD-10-CM

## 2023-08-22 DIAGNOSIS — K21.9 GASTROESOPHAGEAL REFLUX DISEASE WITHOUT ESOPHAGITIS: ICD-10-CM

## 2023-08-22 DIAGNOSIS — M41.25 OTHER IDIOPATHIC SCOLIOSIS, THORACOLUMBAR REGION: Primary | ICD-10-CM

## 2023-08-22 DIAGNOSIS — F41.1 GAD (GENERALIZED ANXIETY DISORDER): ICD-10-CM

## 2023-08-22 PROCEDURE — 3008F BODY MASS INDEX DOCD: CPT | Mod: CPTII,S$GLB,, | Performed by: STUDENT IN AN ORGANIZED HEALTH CARE EDUCATION/TRAINING PROGRAM

## 2023-08-22 PROCEDURE — 99999 PR PBB SHADOW E&M-EST. PATIENT-LVL V: ICD-10-PCS | Mod: PBBFAC,,, | Performed by: STUDENT IN AN ORGANIZED HEALTH CARE EDUCATION/TRAINING PROGRAM

## 2023-08-22 PROCEDURE — 3288F FALL RISK ASSESSMENT DOCD: CPT | Mod: CPTII,S$GLB,, | Performed by: STUDENT IN AN ORGANIZED HEALTH CARE EDUCATION/TRAINING PROGRAM

## 2023-08-22 PROCEDURE — 1101F PR PT FALLS ASSESS DOC 0-1 FALLS W/OUT INJ PAST YR: ICD-10-PCS | Mod: CPTII,S$GLB,, | Performed by: STUDENT IN AN ORGANIZED HEALTH CARE EDUCATION/TRAINING PROGRAM

## 2023-08-22 PROCEDURE — 3288F PR FALLS RISK ASSESSMENT DOCUMENTED: ICD-10-PCS | Mod: CPTII,S$GLB,, | Performed by: STUDENT IN AN ORGANIZED HEALTH CARE EDUCATION/TRAINING PROGRAM

## 2023-08-22 PROCEDURE — 1101F PT FALLS ASSESS-DOCD LE1/YR: CPT | Mod: CPTII,S$GLB,, | Performed by: STUDENT IN AN ORGANIZED HEALTH CARE EDUCATION/TRAINING PROGRAM

## 2023-08-22 PROCEDURE — 99214 PR OFFICE/OUTPT VISIT, EST, LEVL IV, 30-39 MIN: ICD-10-PCS | Mod: S$GLB,,, | Performed by: STUDENT IN AN ORGANIZED HEALTH CARE EDUCATION/TRAINING PROGRAM

## 2023-08-22 PROCEDURE — 1125F PR PAIN SEVERITY QUANTIFIED, PAIN PRESENT: ICD-10-PCS | Mod: CPTII,S$GLB,, | Performed by: STUDENT IN AN ORGANIZED HEALTH CARE EDUCATION/TRAINING PROGRAM

## 2023-08-22 PROCEDURE — 1125F AMNT PAIN NOTED PAIN PRSNT: CPT | Mod: CPTII,S$GLB,, | Performed by: STUDENT IN AN ORGANIZED HEALTH CARE EDUCATION/TRAINING PROGRAM

## 2023-08-22 PROCEDURE — 99214 OFFICE O/P EST MOD 30 MIN: CPT | Mod: S$GLB,,, | Performed by: STUDENT IN AN ORGANIZED HEALTH CARE EDUCATION/TRAINING PROGRAM

## 2023-08-22 PROCEDURE — 99999 PR PBB SHADOW E&M-EST. PATIENT-LVL V: CPT | Mod: PBBFAC,,, | Performed by: STUDENT IN AN ORGANIZED HEALTH CARE EDUCATION/TRAINING PROGRAM

## 2023-08-22 PROCEDURE — 1160F PR REVIEW ALL MEDS BY PRESCRIBER/CLIN PHARMACIST DOCUMENTED: ICD-10-PCS | Mod: CPTII,S$GLB,, | Performed by: STUDENT IN AN ORGANIZED HEALTH CARE EDUCATION/TRAINING PROGRAM

## 2023-08-22 PROCEDURE — 1160F RVW MEDS BY RX/DR IN RCRD: CPT | Mod: CPTII,S$GLB,, | Performed by: STUDENT IN AN ORGANIZED HEALTH CARE EDUCATION/TRAINING PROGRAM

## 2023-08-22 PROCEDURE — 3078F DIAST BP <80 MM HG: CPT | Mod: CPTII,S$GLB,, | Performed by: STUDENT IN AN ORGANIZED HEALTH CARE EDUCATION/TRAINING PROGRAM

## 2023-08-22 PROCEDURE — 3074F PR MOST RECENT SYSTOLIC BLOOD PRESSURE < 130 MM HG: ICD-10-PCS | Mod: CPTII,S$GLB,, | Performed by: STUDENT IN AN ORGANIZED HEALTH CARE EDUCATION/TRAINING PROGRAM

## 2023-08-22 PROCEDURE — 3078F PR MOST RECENT DIASTOLIC BLOOD PRESSURE < 80 MM HG: ICD-10-PCS | Mod: CPTII,S$GLB,, | Performed by: STUDENT IN AN ORGANIZED HEALTH CARE EDUCATION/TRAINING PROGRAM

## 2023-08-22 PROCEDURE — 3008F PR BODY MASS INDEX (BMI) DOCUMENTED: ICD-10-PCS | Mod: CPTII,S$GLB,, | Performed by: STUDENT IN AN ORGANIZED HEALTH CARE EDUCATION/TRAINING PROGRAM

## 2023-08-22 PROCEDURE — 3074F SYST BP LT 130 MM HG: CPT | Mod: CPTII,S$GLB,, | Performed by: STUDENT IN AN ORGANIZED HEALTH CARE EDUCATION/TRAINING PROGRAM

## 2023-08-22 PROCEDURE — 1159F MED LIST DOCD IN RCRD: CPT | Mod: CPTII,S$GLB,, | Performed by: STUDENT IN AN ORGANIZED HEALTH CARE EDUCATION/TRAINING PROGRAM

## 2023-08-22 PROCEDURE — 1159F PR MEDICATION LIST DOCUMENTED IN MEDICAL RECORD: ICD-10-PCS | Mod: CPTII,S$GLB,, | Performed by: STUDENT IN AN ORGANIZED HEALTH CARE EDUCATION/TRAINING PROGRAM

## 2023-08-22 RX ORDER — ALPRAZOLAM 0.5 MG/1
TABLET ORAL
Qty: 8 TABLET | Refills: 2 | Status: SHIPPED | OUTPATIENT
Start: 2023-08-22 | End: 2023-08-22

## 2023-08-22 RX ORDER — OMEPRAZOLE 40 MG/1
40 CAPSULE, DELAYED RELEASE ORAL DAILY PRN
Qty: 90 CAPSULE | Refills: 0
Start: 2023-08-22

## 2023-08-22 RX ORDER — ALPRAZOLAM 1 MG/1
TABLET ORAL
Qty: 8 TABLET | Refills: 2 | Status: SHIPPED | OUTPATIENT
Start: 2023-08-22 | End: 2023-11-30 | Stop reason: SDUPTHER

## 2023-08-22 NOTE — PROGRESS NOTES
Subjective:           Patient ID: Verena Stephenson is a 67 y.o. female who presents today with a chief complaint of (Anxiety, depression & headaches) and Back Pain.    Chief Complaint:   Follow-up (Anxiety, depression & headaches) and Back Pain      History of Present Illness:    67 y.o. female who presents today with a chief complaint of (Anxiety, depression & headaches) and Back Pain.    At [facility] on 5/17/23, was started on Sertaline 150mg daily and has been using Xanax 0.5mg 8 tablets per month, would like to get these to go further and has time between when anxiety is still peaked.    States her  is a  and needs a repair on his boat that may be too expensive for what they need.      Headaches:   - has occasional headaches.   - uses Fiorcet as needed but only uses about 1 times a month, so has most of her bottle left.   - states is helpful and will call when needs a refill.        Review of Systems   Constitutional:  Positive for fatigue. Negative for activity change, fever and unexpected weight change.   HENT:  Negative for congestion, nosebleeds, sinus pressure and sneezing.    Respiratory:  Negative for cough, shortness of breath and wheezing.    Cardiovascular:  Negative for chest pain, palpitations and leg swelling.   Gastrointestinal:  Negative for abdominal distention, constipation, diarrhea and nausea.   Genitourinary:  Negative for difficulty urinating and dysuria.   Musculoskeletal:  Negative for back pain and gait problem.   Skin:  Negative for pallor and rash.   Neurological:  Positive for headaches. Negative for weakness and numbness.   Psychiatric/Behavioral:  Positive for agitation, dysphoric mood and sleep disturbance (sleeping after work daily, then poor sleep at night.). The patient is nervous/anxious.            Objective:        Vitals:    08/22/23 1509 08/22/23 1556   BP: (!) 108/58 122/68   BP Location: Right arm Right arm   Patient Position: Sitting Sitting   BP  "Method: Medium (Manual) Medium (Manual)   Pulse: 62    Resp: 16    Temp: 97.6 °F (36.4 °C)    TempSrc: Temporal    SpO2: 95%    Weight: 82.9 kg (182 lb 12.2 oz)    Height: 5' 1" (1.549 m)        Body mass index is 34.53 kg/m².      Physical Exam  Constitutional:       General: She is not in acute distress.     Appearance: Normal appearance. She is obese.      Comments: As per BMI.   HENT:      Head: Normocephalic.      Right Ear: External ear normal.      Left Ear: External ear normal.      Mouth/Throat:      Mouth: Mucous membranes are moist.   Eyes:      Extraocular Movements: Extraocular movements intact.      Conjunctiva/sclera: Conjunctivae normal.   Cardiovascular:      Rate and Rhythm: Normal rate.      Heart sounds:      No gallop.   Pulmonary:      Effort: Pulmonary effort is normal. No respiratory distress.      Breath sounds: Normal breath sounds.   Abdominal:      General: Abdomen is flat. Bowel sounds are normal. There is no distension.      Palpations: Abdomen is soft.   Musculoskeletal:         General: No swelling or deformity.      Right lower leg: No edema.      Left lower leg: No edema.   Lymphadenopathy:      Cervical: No cervical adenopathy.   Skin:     General: Skin is warm.      Capillary Refill: Capillary refill takes less than 2 seconds.      Coloration: Skin is not jaundiced.   Neurological:      General: No focal deficit present.      Mental Status: She is alert and oriented to person, place, and time.      Motor: No weakness.   Psychiatric:      Comments: Less upset today vs last appt, but still showing anxiety about her situation.             Lab Results   Component Value Date     08/11/2023    K 3.8 08/11/2023     08/11/2023    CO2 29 08/11/2023    BUN 9 08/11/2023    CREATININE 0.78 08/11/2023     Lab Results   Component Value Date    HGBA1C 5.4 09/21/2022     No results found for: "BNP", "BNPTRIAGEBLO"    Lab Results   Component Value Date    WBC 8.9 08/11/2023    HGB 15.4 " 08/11/2023    HCT 48.2 (H) 08/11/2023     08/11/2023     Lab Results   Component Value Date    CHOL 184 08/11/2023    HDL 53 08/11/2023    LDLCALC 113 (H) 08/11/2023    TRIG 85 08/11/2023          Current Outpatient Medications:     amLODIPine (NORVASC) 5 MG tablet, Take 1 tablet (5 mg total) by mouth once daily., Disp: 90 tablet, Rfl: 3    melatonin 10 mg Tab, Take 1 tablet by mouth nightly as needed., Disp: , Rfl:     meloxicam (MOBIC) 15 MG tablet, TAKE 1 TABLET BY MOUTH DAILY AS NEEDED FOR PAIN., Disp: 30 tablet, Rfl: 1    sertraline (ZOLOFT) 50 MG tablet, Take 3 tablets (150 mg total) by mouth once daily., Disp: 90 tablet, Rfl: 11    ALPRAZolam (XANAX) 1 MG tablet, Take up to 2 tabs per week.  8 tabs for 1 month supply., Disp: 8 tablet, Rfl: 2    omeprazole (PRILOSEC) 40 MG capsule, Take 1 capsule (40 mg total) by mouth daily as needed (acid reflux)., Disp: 90 capsule, Rfl: 0     Outpatient Encounter Medications as of 8/22/2023   Medication Sig Dispense Refill    amLODIPine (NORVASC) 5 MG tablet Take 1 tablet (5 mg total) by mouth once daily. 90 tablet 3    melatonin 10 mg Tab Take 1 tablet by mouth nightly as needed.      meloxicam (MOBIC) 15 MG tablet TAKE 1 TABLET BY MOUTH DAILY AS NEEDED FOR PAIN. 30 tablet 1    sertraline (ZOLOFT) 50 MG tablet Take 3 tablets (150 mg total) by mouth once daily. 90 tablet 11    [DISCONTINUED] ALPRAZolam (XANAX) 0.5 MG tablet Take up to 2 tabs per week.  8 tabs for 1 month supply. 8 tablet 2    [DISCONTINUED] omeprazole (PRILOSEC) 40 MG capsule Take 1 capsule (40 mg total) by mouth every morning. 90 capsule 0    ALPRAZolam (XANAX) 1 MG tablet Take up to 2 tabs per week.  8 tabs for 1 month supply. 8 tablet 2    omeprazole (PRILOSEC) 40 MG capsule Take 1 capsule (40 mg total) by mouth daily as needed (acid reflux). 90 capsule 0    [DISCONTINUED] ALPRAZolam (XANAX) 0.5 MG tablet Take up to 2 tabs per week.  8 tabs for 1 month supply. 8 tablet 2     No  facility-administered encounter medications on file as of 8/22/2023.          Assessment:       1. Other idiopathic scoliosis, thoracolumbar region    2. JENNA (generalized anxiety disorder)    3. Current moderate episode of major depressive disorder without prior episode    4. Gastroesophageal reflux disease without esophagitis           Plan:       Other idiopathic scoliosis, thoracolumbar region  -     Ambulatory referral/consult to Pain Clinic; Future; Expected date: 08/29/2023    JENNA (generalized anxiety disorder)  -     Discontinue: ALPRAZolam (XANAX) 0.5 MG tablet; Take up to 2 tabs per week.  8 tabs for 1 month supply.  Dispense: 8 tablet; Refill: 2  -     ALPRAZolam (XANAX) 1 MG tablet; Take up to 2 tabs per week.  8 tabs for 1 month supply.  Dispense: 8 tablet; Refill: 2    Current moderate episode of major depressive disorder without prior episode  -     Discontinue: ALPRAZolam (XANAX) 0.5 MG tablet; Take up to 2 tabs per week.  8 tabs for 1 month supply.  Dispense: 8 tablet; Refill: 2  -     ALPRAZolam (XANAX) 1 MG tablet; Take up to 2 tabs per week.  8 tabs for 1 month supply.  Dispense: 8 tablet; Refill: 2    Gastroesophageal reflux disease without esophagitis  -     omeprazole (PRILOSEC) 40 MG capsule; Take 1 capsule (40 mg total) by mouth daily as needed (acid reflux).  Dispense: 90 capsule; Refill: 0               Depression/anxiety:   - taking Zoloft 50mg x 3 tabs daily (150mg daily dose).    - has use Xanax 0.5 mg up to 8 doses per month with significant relief in symptoms, but still having times with breakthrough anxiety and stress.  Requesting dose increase to 1 mg, will increase to Xanax 1 mg 8 doses per month at this time.    HTN:   - BP controlled today.    - continue unchanged at this time.     Lumbar Back Pain:   - patient with significant or back pain, states had history of scoliosis that has not been treated previously.  Has been bothering significantly recently.   - requesting referral to  pain management to consider epidural injection, referral placed to Dr. Johns.    Headaches:   - chronic recurrent headaches, tension headaches with pressure in the front of the forehead.   - recommend deep breathing, relaxation, keeping well hydrated.   - has used meloxicam at times with some benefit, additionally has used Fioricet about once per month with significant relief of headache when used.  States she still has over half of her last bottle available and does not need refill at this time.

## 2023-08-22 NOTE — PATIENT INSTRUCTIONS
Depression/anxiety:   - taking Zoloft 50mg x 3 tabs daily (150mg daily dose).    - has use Xanax 0.5 mg up to 8 doses per month with significant relief in symptoms, but still having times with breakthrough anxiety and stress.  Requesting dose increase to 1 mg, will increase to Xanax 1 mg 8 doses per month at this time.    HTN:   - BP controlled today.    - continue unchanged at this time.     Lumbar Back Pain:   - patient with significant or back pain, states had history of scoliosis that has not been treated previously.  Has been bothering significantly recently.   - requesting referral to pain management to consider epidural injection, referral placed to Dr. Johns.    Headaches:   - chronic recurrent headaches, tension headaches with pressure in the front of the forehead.   - recommend deep breathing, relaxation, keeping well hydrated.   - has used meloxicam at times with some benefit, additionally has used Fioricet about once per month with significant relief of headache when used.  States she still has over half of her last bottle available and does not need refill at this time.

## 2023-09-18 ENCOUNTER — PATIENT MESSAGE (OUTPATIENT)
Dept: PRIMARY CARE CLINIC | Facility: CLINIC | Age: 68
End: 2023-09-18
Payer: COMMERCIAL

## 2023-10-18 ENCOUNTER — PATIENT MESSAGE (OUTPATIENT)
Dept: CARDIOLOGY | Facility: CLINIC | Age: 68
End: 2023-10-18
Payer: COMMERCIAL

## 2023-11-02 DIAGNOSIS — F41.1 GAD (GENERALIZED ANXIETY DISORDER): ICD-10-CM

## 2023-11-02 DIAGNOSIS — F32.1 CURRENT MODERATE EPISODE OF MAJOR DEPRESSIVE DISORDER WITHOUT PRIOR EPISODE: Chronic | ICD-10-CM

## 2023-11-02 RX ORDER — ESCITALOPRAM OXALATE 20 MG/1
20 TABLET ORAL DAILY
Qty: 90 TABLET | Refills: 4 | OUTPATIENT
Start: 2023-11-02 | End: 2024-11-01

## 2023-11-02 NOTE — TELEPHONE ENCOUNTER
No care due was identified.  Health [facility] Embedded Care Due Messages. Reference number: [id number].   11/02/2023 1:18:07 PM CDT

## 2023-11-03 NOTE — TELEPHONE ENCOUNTER
Refill Routing Note   Medication(s) are not appropriate for processing by [facility] for the following reason(s):      No active prescription written by provider    [facility] action(s):  Defer Care Due:  None identified     Medication Therapy Plan: discontinued on 5/17/2023 by Neetu Monahan MA, last filled on 08/03/2023 per Epic Adherence Data        Appointments  past 12m or future 3m with PCP    Date Provider   Last Visit   8/22/2023 Jun Teixeira MD   Next Visit   11/30/2023 Jun Teixeira MD   ED visits in past 90 days: 0        Note composed:7:22 PM 11/02/2023

## 2023-11-30 ENCOUNTER — OFFICE VISIT (OUTPATIENT)
Dept: PRIMARY CARE CLINIC | Facility: CLINIC | Age: 68
End: 2023-11-30
Payer: COMMERCIAL

## 2023-11-30 VITALS
OXYGEN SATURATION: 94 % | WEIGHT: 181.75 LBS | RESPIRATION RATE: 16 BRPM | TEMPERATURE: 97 F | HEIGHT: 61 IN | SYSTOLIC BLOOD PRESSURE: 122 MMHG | HEART RATE: 66 BPM | DIASTOLIC BLOOD PRESSURE: 60 MMHG | BODY MASS INDEX: 34.31 KG/M2

## 2023-11-30 DIAGNOSIS — F32.1 CURRENT MODERATE EPISODE OF MAJOR DEPRESSIVE DISORDER WITHOUT PRIOR EPISODE: Primary | ICD-10-CM

## 2023-11-30 DIAGNOSIS — M79.10 MUSCLE PAIN: ICD-10-CM

## 2023-11-30 DIAGNOSIS — I10 ESSENTIAL HYPERTENSION: Chronic | ICD-10-CM

## 2023-11-30 DIAGNOSIS — F41.1 GAD (GENERALIZED ANXIETY DISORDER): ICD-10-CM

## 2023-11-30 PROCEDURE — 99214 OFFICE O/P EST MOD 30 MIN: CPT | Mod: S$GLB,,, | Performed by: STUDENT IN AN ORGANIZED HEALTH CARE EDUCATION/TRAINING PROGRAM

## 2023-11-30 PROCEDURE — 1159F PR MEDICATION LIST DOCUMENTED IN MEDICAL RECORD: ICD-10-PCS | Mod: CPTII,S$GLB,, | Performed by: STUDENT IN AN ORGANIZED HEALTH CARE EDUCATION/TRAINING PROGRAM

## 2023-11-30 PROCEDURE — 3074F PR MOST RECENT SYSTOLIC BLOOD PRESSURE < 130 MM HG: ICD-10-PCS | Mod: CPTII,S$GLB,, | Performed by: STUDENT IN AN ORGANIZED HEALTH CARE EDUCATION/TRAINING PROGRAM

## 2023-11-30 PROCEDURE — 3078F PR MOST RECENT DIASTOLIC BLOOD PRESSURE < 80 MM HG: ICD-10-PCS | Mod: CPTII,S$GLB,, | Performed by: STUDENT IN AN ORGANIZED HEALTH CARE EDUCATION/TRAINING PROGRAM

## 2023-11-30 PROCEDURE — 3008F PR BODY MASS INDEX (BMI) DOCUMENTED: ICD-10-PCS | Mod: CPTII,S$GLB,, | Performed by: STUDENT IN AN ORGANIZED HEALTH CARE EDUCATION/TRAINING PROGRAM

## 2023-11-30 PROCEDURE — 3288F PR FALLS RISK ASSESSMENT DOCUMENTED: ICD-10-PCS | Mod: CPTII,S$GLB,, | Performed by: STUDENT IN AN ORGANIZED HEALTH CARE EDUCATION/TRAINING PROGRAM

## 2023-11-30 PROCEDURE — 99999 PR PBB SHADOW E&M-EST. PATIENT-LVL IV: ICD-10-PCS | Mod: PBBFAC,,, | Performed by: STUDENT IN AN ORGANIZED HEALTH CARE EDUCATION/TRAINING PROGRAM

## 2023-11-30 PROCEDURE — 1159F MED LIST DOCD IN RCRD: CPT | Mod: CPTII,S$GLB,, | Performed by: STUDENT IN AN ORGANIZED HEALTH CARE EDUCATION/TRAINING PROGRAM

## 2023-11-30 PROCEDURE — 1101F PT FALLS ASSESS-DOCD LE1/YR: CPT | Mod: CPTII,S$GLB,, | Performed by: STUDENT IN AN ORGANIZED HEALTH CARE EDUCATION/TRAINING PROGRAM

## 2023-11-30 PROCEDURE — 1160F PR REVIEW ALL MEDS BY PRESCRIBER/CLIN PHARMACIST DOCUMENTED: ICD-10-PCS | Mod: CPTII,S$GLB,, | Performed by: STUDENT IN AN ORGANIZED HEALTH CARE EDUCATION/TRAINING PROGRAM

## 2023-11-30 PROCEDURE — 99214 PR OFFICE/OUTPT VISIT, EST, LEVL IV, 30-39 MIN: ICD-10-PCS | Mod: S$GLB,,, | Performed by: STUDENT IN AN ORGANIZED HEALTH CARE EDUCATION/TRAINING PROGRAM

## 2023-11-30 PROCEDURE — 1126F AMNT PAIN NOTED NONE PRSNT: CPT | Mod: CPTII,S$GLB,, | Performed by: STUDENT IN AN ORGANIZED HEALTH CARE EDUCATION/TRAINING PROGRAM

## 2023-11-30 PROCEDURE — 3074F SYST BP LT 130 MM HG: CPT | Mod: CPTII,S$GLB,, | Performed by: STUDENT IN AN ORGANIZED HEALTH CARE EDUCATION/TRAINING PROGRAM

## 2023-11-30 PROCEDURE — 99999 PR PBB SHADOW E&M-EST. PATIENT-LVL IV: CPT | Mod: PBBFAC,,, | Performed by: STUDENT IN AN ORGANIZED HEALTH CARE EDUCATION/TRAINING PROGRAM

## 2023-11-30 PROCEDURE — 1160F RVW MEDS BY RX/DR IN RCRD: CPT | Mod: CPTII,S$GLB,, | Performed by: STUDENT IN AN ORGANIZED HEALTH CARE EDUCATION/TRAINING PROGRAM

## 2023-11-30 PROCEDURE — 3008F BODY MASS INDEX DOCD: CPT | Mod: CPTII,S$GLB,, | Performed by: STUDENT IN AN ORGANIZED HEALTH CARE EDUCATION/TRAINING PROGRAM

## 2023-11-30 PROCEDURE — 1101F PR PT FALLS ASSESS DOC 0-1 FALLS W/OUT INJ PAST YR: ICD-10-PCS | Mod: CPTII,S$GLB,, | Performed by: STUDENT IN AN ORGANIZED HEALTH CARE EDUCATION/TRAINING PROGRAM

## 2023-11-30 PROCEDURE — 3078F DIAST BP <80 MM HG: CPT | Mod: CPTII,S$GLB,, | Performed by: STUDENT IN AN ORGANIZED HEALTH CARE EDUCATION/TRAINING PROGRAM

## 2023-11-30 PROCEDURE — 3288F FALL RISK ASSESSMENT DOCD: CPT | Mod: CPTII,S$GLB,, | Performed by: STUDENT IN AN ORGANIZED HEALTH CARE EDUCATION/TRAINING PROGRAM

## 2023-11-30 PROCEDURE — 1126F PR PAIN SEVERITY QUANTIFIED, NO PAIN PRESENT: ICD-10-PCS | Mod: CPTII,S$GLB,, | Performed by: STUDENT IN AN ORGANIZED HEALTH CARE EDUCATION/TRAINING PROGRAM

## 2023-11-30 RX ORDER — SERTRALINE HYDROCHLORIDE 100 MG/1
200 TABLET, FILM COATED ORAL DAILY
Qty: 60 TABLET | Refills: 11 | Status: SHIPPED | OUTPATIENT
Start: 2023-11-30 | End: 2024-11-29

## 2023-11-30 RX ORDER — GABAPENTIN 300 MG/1
300 CAPSULE ORAL NIGHTLY PRN
Qty: 90 CAPSULE | Refills: 0 | Status: SHIPPED | OUTPATIENT
Start: 2023-11-30 | End: 2024-03-04 | Stop reason: SDUPTHER

## 2023-11-30 RX ORDER — GABAPENTIN 300 MG/1
300 CAPSULE ORAL NIGHTLY PRN
COMMUNITY
Start: 2023-09-21 | End: 2023-11-30 | Stop reason: SDUPTHER

## 2023-11-30 RX ORDER — AMLODIPINE BESYLATE 5 MG/1
5 TABLET ORAL DAILY
Qty: 90 TABLET | Refills: 3 | Status: SHIPPED | OUTPATIENT
Start: 2023-11-30 | End: 2024-04-02

## 2023-11-30 RX ORDER — ALPRAZOLAM 1 MG/1
TABLET ORAL
Qty: 8 TABLET | Refills: 2 | Status: SHIPPED | OUTPATIENT
Start: 2023-11-30 | End: 2024-03-04 | Stop reason: SDUPTHER

## 2023-11-30 NOTE — PATIENT INSTRUCTIONS
Depression:    - States sertaline seems a bit weak, would like to increase from 150mg daily.    - increase dose to Sertaline 200mg daily at this time.     Anxiety:   - refilling PRN Xanax, 8  tabs monthly for 3 months.     GERD:   - Taking GERD med (PPI) PRN.  Is well controlled most of the time.     MSK pain:   - Using Gabapentin as night a times when more active during day.  Only uses occasionally and is helpful.

## 2023-11-30 NOTE — PROGRESS NOTES
"Subjective:           Patient ID: Verena Stephenson is a 68 y.o. female who presents today with a chief complaint of Medication Refill  .    Chief Complaint:   Medication Refill      History of Present Illness:    Verena Stephenson is a 68 y.o. female who presents today with a chief complaint of Medication Refill  .    67yo female presenting for f/u appt.    States sertaline seems a bit weak, would like to increase from 150mg daily.    Using Gabapentin as night a times when more active during day.  Only uses occasionally and is helpful.    Taking GERD med (PPI) PRN.  Is well controlled most of the time.     Review of Systems   Constitutional:  Positive for fatigue. Negative for activity change, fever and unexpected weight change.   HENT:  Negative for congestion, nosebleeds, sinus pressure and sneezing.    Respiratory:  Negative for cough, shortness of breath and wheezing.    Cardiovascular:  Negative for chest pain, palpitations and leg swelling.   Gastrointestinal:  Negative for abdominal distention, constipation, diarrhea and nausea.   Genitourinary:  Negative for difficulty urinating and dysuria.   Musculoskeletal:  Negative for back pain and gait problem.   Skin:  Negative for pallor and rash.   Neurological:  Positive for headaches. Negative for weakness and numbness.   Psychiatric/Behavioral:  Positive for agitation, dysphoric mood and sleep disturbance (sleeping after work daily, then poor sleep at night.). The patient is nervous/anxious.            Objective:        Vitals:    11/30/23 1331   BP: 122/60   BP Location: Right arm   Patient Position: Sitting   BP Method: Medium (Manual)   Pulse: 66   Resp: 16   Temp: 97.2 °F (36.2 °C)   TempSrc: Temporal   SpO2: (!) 94%   Weight: 82.5 kg (181 lb 12.3 oz)   Height: 5' 1" (1.549 m)       Body mass index is 34.35 kg/m².      Physical Exam  Constitutional:       General: She is not in acute distress.     Appearance: Normal appearance. She is obese.      Comments: As " "per BMI.   HENT:      Head: Normocephalic.      Right Ear: External ear normal.      Left Ear: External ear normal.      Mouth/Throat:      Mouth: Mucous membranes are moist.   Eyes:      Extraocular Movements: Extraocular movements intact.      Conjunctiva/sclera: Conjunctivae normal.   Cardiovascular:      Rate and Rhythm: Normal rate.      Heart sounds:      No gallop.   Pulmonary:      Effort: Pulmonary effort is normal. No respiratory distress.      Breath sounds: Normal breath sounds.   Abdominal:      General: Abdomen is flat. Bowel sounds are normal. There is no distension.      Palpations: Abdomen is soft.   Musculoskeletal:         General: No swelling or deformity.      Right lower leg: No edema.      Left lower leg: No edema.   Lymphadenopathy:      Cervical: No cervical adenopathy.   Skin:     General: Skin is warm.      Capillary Refill: Capillary refill takes less than 2 seconds.      Coloration: Skin is not jaundiced.   Neurological:      General: No focal deficit present.      Mental Status: She is alert and oriented to person, place, and time.      Motor: No weakness.   Psychiatric:      Comments: Less upset today vs last appt, but still showing anxiety about her situation.             Lab Results   Component Value Date     08/11/2023    K 3.8 08/11/2023     08/11/2023    CO2 29 08/11/2023    BUN 9 08/11/2023    CREATININE 0.78 08/11/2023     Lab Results   Component Value Date    HGBA1C 5.4 09/21/2022     No results found for: "BNP", "BNPTRIAGEBLO"    Lab Results   Component Value Date    WBC 8.9 08/11/2023    HGB 15.4 08/11/2023    HCT 48.2 (H) 08/11/2023     08/11/2023     Lab Results   Component Value Date    CHOL 184 08/11/2023    HDL 53 08/11/2023    LDLCALC 113 (H) 08/11/2023    TRIG 85 08/11/2023          Current Outpatient Medications:     melatonin 10 mg Tab, Take 1 tablet by mouth nightly as needed., Disp: , Rfl:     meloxicam (MOBIC) 15 MG tablet, TAKE 1 TABLET BY " MOUTH DAILY AS NEEDED FOR PAIN., Disp: 30 tablet, Rfl: 1    omeprazole (PRILOSEC) 40 MG capsule, Take 1 capsule (40 mg total) by mouth daily as needed (acid reflux)., Disp: 90 capsule, Rfl: 0    ALPRAZolam (XANAX) 1 MG tablet, Take up to 2 tabs per week.  8 tabs for 1 month supply., Disp: 8 tablet, Rfl: 2    amLODIPine (NORVASC) 5 MG tablet, Take 1 tablet (5 mg total) by mouth once daily., Disp: 90 tablet, Rfl: 3    gabapentin (NEURONTIN) 300 MG capsule, Take 1 capsule (300 mg total) by mouth nightly as needed (muscle aches or pains)., Disp: 90 capsule, Rfl: 0    sertraline (ZOLOFT) 100 MG tablet, Take 2 tablets (200 mg total) by mouth once daily., Disp: 60 tablet, Rfl: 11     Outpatient Encounter Medications as of 11/30/2023   Medication Sig Dispense Refill    melatonin 10 mg Tab Take 1 tablet by mouth nightly as needed.      meloxicam (MOBIC) 15 MG tablet TAKE 1 TABLET BY MOUTH DAILY AS NEEDED FOR PAIN. 30 tablet 1    omeprazole (PRILOSEC) 40 MG capsule Take 1 capsule (40 mg total) by mouth daily as needed (acid reflux). 90 capsule 0    [DISCONTINUED] ALPRAZolam (XANAX) 1 MG tablet Take up to 2 tabs per week.  8 tabs for 1 month supply. 8 tablet 2    [DISCONTINUED] amLODIPine (NORVASC) 5 MG tablet Take 1 tablet (5 mg total) by mouth once daily. 90 tablet 3    [DISCONTINUED] gabapentin (NEURONTIN) 300 MG capsule Take 300 mg by mouth nightly as needed.      [DISCONTINUED] sertraline (ZOLOFT) 50 MG tablet Take 3 tablets (150 mg total) by mouth once daily. 90 tablet 11    ALPRAZolam (XANAX) 1 MG tablet Take up to 2 tabs per week.  8 tabs for 1 month supply. 8 tablet 2    amLODIPine (NORVASC) 5 MG tablet Take 1 tablet (5 mg total) by mouth once daily. 90 tablet 3    gabapentin (NEURONTIN) 300 MG capsule Take 1 capsule (300 mg total) by mouth nightly as needed (muscle aches or pains). 90 capsule 0    sertraline (ZOLOFT) 100 MG tablet Take 2 tablets (200 mg total) by mouth once daily. 60 tablet 11     No  facility-administered encounter medications on file as of 11/30/2023.          Assessment:       1. Current moderate episode of major depressive disorder without prior episode    2. Essential hypertension    3. Muscle pain    4. JENNA (generalized anxiety disorder)           Plan:       Current moderate episode of major depressive disorder without prior episode  -     sertraline (ZOLOFT) 100 MG tablet; Take 2 tablets (200 mg total) by mouth once daily.  Dispense: 60 tablet; Refill: 11  -     ALPRAZolam (XANAX) 1 MG tablet; Take up to 2 tabs per week.  8 tabs for 1 month supply.  Dispense: 8 tablet; Refill: 2    Essential hypertension  -     amLODIPine (NORVASC) 5 MG tablet; Take 1 tablet (5 mg total) by mouth once daily.  Dispense: 90 tablet; Refill: 3    Muscle pain  -     gabapentin (NEURONTIN) 300 MG capsule; Take 1 capsule (300 mg total) by mouth nightly as needed (muscle aches or pains).  Dispense: 90 capsule; Refill: 0    JENNA (generalized anxiety disorder)  -     ALPRAZolam (XANAX) 1 MG tablet; Take up to 2 tabs per week.  8 tabs for 1 month supply.  Dispense: 8 tablet; Refill: 2               Depression:    - States sertaline seems a bit weak, would like to increase from 150mg daily.    - increase dose to Sertaline 200mg daily at this time.     Anxiety:   - refilling PRN Xanax, 8  tabs monthly for 3 months.     GERD:   - Taking GERD med (PPI) PRN.  Is well controlled most of the time.     MSK pain:   - Using Gabapentin as night a times when more active during day.  Only uses occasionally and is helpful.

## 2024-02-29 ENCOUNTER — TELEPHONE (OUTPATIENT)
Dept: PRIMARY CARE CLINIC | Facility: CLINIC | Age: 69
End: 2024-02-29
Payer: COMMERCIAL

## 2024-02-29 NOTE — TELEPHONE ENCOUNTER
----- Message from Aurora Buckner sent at 2/29/2024  9:47 AM CST -----  Contact: [phone]  Patient called, would like to know if is possible for her to have her visit from today 02/2924 virtual instead. Please advise. Thank you

## 2024-03-04 ENCOUNTER — OFFICE VISIT (OUTPATIENT)
Dept: PRIMARY CARE CLINIC | Facility: CLINIC | Age: 69
End: 2024-03-04
Payer: COMMERCIAL

## 2024-03-04 DIAGNOSIS — F41.1 GAD (GENERALIZED ANXIETY DISORDER): ICD-10-CM

## 2024-03-04 DIAGNOSIS — M79.10 MUSCLE PAIN: ICD-10-CM

## 2024-03-04 DIAGNOSIS — F32.1 CURRENT MODERATE EPISODE OF MAJOR DEPRESSIVE DISORDER WITHOUT PRIOR EPISODE: ICD-10-CM

## 2024-03-04 DIAGNOSIS — M54.50 LUMBAR BACK PAIN: Primary | ICD-10-CM

## 2024-03-04 PROCEDURE — 1159F MED LIST DOCD IN RCRD: CPT | Mod: CPTII,95,, | Performed by: STUDENT IN AN ORGANIZED HEALTH CARE EDUCATION/TRAINING PROGRAM

## 2024-03-04 PROCEDURE — 99213 OFFICE O/P EST LOW 20 MIN: CPT | Mod: 95,,, | Performed by: STUDENT IN AN ORGANIZED HEALTH CARE EDUCATION/TRAINING PROGRAM

## 2024-03-04 PROCEDURE — 1160F RVW MEDS BY RX/DR IN RCRD: CPT | Mod: CPTII,95,, | Performed by: STUDENT IN AN ORGANIZED HEALTH CARE EDUCATION/TRAINING PROGRAM

## 2024-03-04 RX ORDER — GABAPENTIN 300 MG/1
300 CAPSULE ORAL NIGHTLY PRN
Qty: 90 CAPSULE | Refills: 1 | Status: SHIPPED | OUTPATIENT
Start: 2024-03-04

## 2024-03-04 RX ORDER — ALPRAZOLAM 1 MG/1
TABLET ORAL
Qty: 8 TABLET | Refills: 2 | Status: SHIPPED | OUTPATIENT
Start: 2024-03-04

## 2024-03-04 NOTE — PROGRESS NOTES
The patient location is: [place]  The chief complaint leading to consultation is: Depression    Visit type: audiovisual    Face to Face time with patient: 15  15 minutes of total time spent on the encounter, which includes face to face time and non-face to face time preparing to see the patient (eg, review of tests), Obtaining and/or reviewing separately obtained history, Documenting clinical information in the electronic or other health record, Independently interpreting results (not separately reported) and communicating results to the patient/family/caregiver, or Care coordination (not separately reported).         Each patient to whom he or she provides medical services by telemedicine is:  (1) informed of the relationship between the physician and patient and the respective role of any other health care provider with respect to management of the patient; and (2) notified that he or she may decline to receive medical services by telemedicine and may withdraw from such care at any time.    Notes:       Has been taking Sertraline 200mg daily at this time.  Does not notice any major change.     States that mood is a yo-yo, going up and down.  Related to what's going on that day.  Is still working currently. Things are good at work.      Has not been able to fit a counselor into the schedule at this time.     Has been using her Xanax occasionally, not most days.  When taken is very effective.    Has been getting pain in the back and on the left side over night.  Does not suspect kidney stone.          Physical Exam  Constitutional:       General: She is not in acute distress.     Appearance: Normal appearance.   HENT:      Right Ear: External ear normal.      Left Ear: External ear normal.      Nose: No rhinorrhea.   Eyes:      Extraocular Movements: Extraocular movements intact.   Pulmonary:      Effort: Pulmonary effort is normal. No respiratory distress.   Neurological:      Mental Status: She is alert and  oriented to person, place, and time.   Psychiatric:         Mood and Affect: Mood normal.         Behavior: Behavior normal.       JENNA:   - taking Sertraline 200mg daily and tolerating well.   - Xanax for anxiety and sleep PRN, state takes every couple days working really well.    Insomnia:   - some sleep issues, has been using Xanax PRN.   - encouraged to also focus on good sleep hygiene.

## 2024-03-04 NOTE — PATIENT INSTRUCTIONS
JENNA:   - taking Sertraline 200mg daily and tolerating well.   - Xanax for anxiety and sleep PRN, state takes every couple days working really well.    Insomnia:   - some sleep issues, has been using Xanax PRN.   - encouraged to also focus on good sleep hygiene.

## 2024-04-01 DIAGNOSIS — I10 ESSENTIAL HYPERTENSION: Chronic | ICD-10-CM

## 2024-04-01 NOTE — TELEPHONE ENCOUNTER
No care due was identified.  Health [facility] Embedded Care Due Messages. Reference number: [id number].   4/01/2024 1:51:07 PM CDT

## 2024-04-02 RX ORDER — AMLODIPINE BESYLATE 5 MG/1
5 TABLET ORAL DAILY
Qty: 90 TABLET | Refills: 2 | Status: SHIPPED | OUTPATIENT
Start: 2024-04-02

## 2024-04-02 NOTE — TELEPHONE ENCOUNTER
Verena Stephenson  is requesting a refill authorization.  Brief Assessment and Rationale for Refill:  Approve     Medication Therapy Plan:         Comments:     Note composed:5:11 AM 04/02/2024

## 2024-07-16 ENCOUNTER — OFFICE VISIT (OUTPATIENT)
Dept: PRIMARY CARE CLINIC | Facility: CLINIC | Age: 69
End: 2024-07-16
Payer: COMMERCIAL

## 2024-07-16 DIAGNOSIS — F32.1 CURRENT MODERATE EPISODE OF MAJOR DEPRESSIVE DISORDER WITHOUT PRIOR EPISODE: ICD-10-CM

## 2024-07-16 DIAGNOSIS — M79.10 MUSCLE PAIN: ICD-10-CM

## 2024-07-16 DIAGNOSIS — F41.1 GAD (GENERALIZED ANXIETY DISORDER): ICD-10-CM

## 2024-07-16 DIAGNOSIS — I10 ESSENTIAL HYPERTENSION: Chronic | ICD-10-CM

## 2024-07-16 DIAGNOSIS — K21.9 GASTROESOPHAGEAL REFLUX DISEASE WITHOUT ESOPHAGITIS: ICD-10-CM

## 2024-07-16 PROCEDURE — 1159F MED LIST DOCD IN RCRD: CPT | Mod: CPTII,95,, | Performed by: STUDENT IN AN ORGANIZED HEALTH CARE EDUCATION/TRAINING PROGRAM

## 2024-07-16 PROCEDURE — 1160F RVW MEDS BY RX/DR IN RCRD: CPT | Mod: CPTII,95,, | Performed by: STUDENT IN AN ORGANIZED HEALTH CARE EDUCATION/TRAINING PROGRAM

## 2024-07-16 PROCEDURE — 99214 OFFICE O/P EST MOD 30 MIN: CPT | Mod: 95,,, | Performed by: STUDENT IN AN ORGANIZED HEALTH CARE EDUCATION/TRAINING PROGRAM

## 2024-07-16 RX ORDER — OMEPRAZOLE 40 MG/1
40 CAPSULE, DELAYED RELEASE ORAL DAILY PRN
Qty: 90 CAPSULE | Refills: 3 | Status: SHIPPED | OUTPATIENT
Start: 2024-07-16

## 2024-07-16 RX ORDER — GABAPENTIN 300 MG/1
300 CAPSULE ORAL NIGHTLY PRN
Qty: 90 CAPSULE | Refills: 3 | Status: SHIPPED | OUTPATIENT
Start: 2024-07-16

## 2024-07-16 RX ORDER — ALPRAZOLAM 1 MG/1
TABLET ORAL
Qty: 8 TABLET | Refills: 2 | Status: SHIPPED | OUTPATIENT
Start: 2024-07-16 | End: 2024-07-16

## 2024-07-16 RX ORDER — AMLODIPINE BESYLATE 5 MG/1
5 TABLET ORAL DAILY
Qty: 90 TABLET | Refills: 3 | Status: SHIPPED | OUTPATIENT
Start: 2024-07-16

## 2024-07-16 RX ORDER — ALPRAZOLAM 1 MG/1
TABLET ORAL
Qty: 12 TABLET | Refills: 2 | Status: SHIPPED | OUTPATIENT
Start: 2024-07-16

## 2024-07-16 NOTE — PROGRESS NOTES
The patient location is: [place]  The chief complaint leading to consultation is: anxiety, med refill    Visit type: audiovisual    Face to Face time with patient: 15  20 minutes of total time spent on the encounter, which includes face to face time and non-face to face time preparing to see the patient (eg, review of tests), Obtaining and/or reviewing separately obtained history, Documenting clinical information in the electronic or other health record, Independently interpreting results (not separately reported) and communicating results to the patient/family/caregiver, or Care coordination (not separately reported).         Each patient to whom he or she provides medical services by telemedicine is:  (1) informed of the relationship between the physician and patient and the respective role of any other health care provider with respect to management of the patient; and (2) notified that he or she may decline to receive medical services by telemedicine and may withdraw from such care at any time.    Notes:     67yo female presenting for f/u appt.   States needing refills on some meds.    Is taking Sertraline 200mg daily.  Has 6 refills at this time.  Amlodipine 5mg daily, has 1 refill.  Gabapentin 300mg PRN nightly, taking some nights as needed, has 1 refill at time.   Prilosec as needed.     Xanax 1mg, has been taking 1/2 a tab at a time and has been pretty rough.            Physical Exam  Constitutional:       General: She is not in acute distress.     Appearance: Normal appearance.   HENT:      Right Ear: External ear normal.      Left Ear: External ear normal.      Nose: No rhinorrhea.   Eyes:      Extraocular Movements: Extraocular movements intact.   Pulmonary:      Effort: Pulmonary effort is normal. No respiratory distress.   Neurological:      Mental Status: She is alert and oriented to person, place, and time.   Psychiatric:         Mood and Affect: Mood normal.         Behavior: Behavior normal.            JENNA:             - taking Sertraline 200mg daily and tolerating well.             - Xanax for anxiety and sleep PRN, state takes every couple days working really well.   - increase monthly dose of Xanax from 8/month to 12/month due to report of high stress.  Do have some concern about frequency of use and patient's coping abilities.      GERD:    - using PPI some days, but only once a week or so.  Has 90 pills in August.     HTN:   - amlodipine 5mg refilled.   - states is controlled at home currently.     Insomnia:             - some sleep issues, has been using Xanax PRN.             - encouraged to also focus on good sleep hygiene.

## 2024-07-16 NOTE — PATIENT INSTRUCTIONS
JENNA:             - taking Sertraline 200mg daily and tolerating well.             - Xanax for anxiety and sleep PRN, state takes every couple days working really well.   - increase monthly dose of Xanax from 8/month to 12/month due to report of high stress.  Do have some concern about frequency of use and patient's coping abilities.      GERD:    - using PPI some days, but only once a week or so.  Has 90 pills in August.     HTN:   - amlodipine 5mg refilled.   - states is controlled at home currently.     Insomnia:             - some sleep issues, has been using Xanax PRN.             - encouraged to also focus on good sleep hygiene.

## 2024-07-22 ENCOUNTER — PATIENT MESSAGE (OUTPATIENT)
Dept: PRIMARY CARE CLINIC | Facility: CLINIC | Age: 69
End: 2024-07-22
Payer: COMMERCIAL

## 2024-09-13 ENCOUNTER — PATIENT MESSAGE (OUTPATIENT)
Dept: PRIMARY CARE CLINIC | Facility: CLINIC | Age: 69
End: 2024-09-13
Payer: COMMERCIAL

## 2024-09-13 DIAGNOSIS — Z00.00 ANNUAL PHYSICAL EXAM: ICD-10-CM

## 2024-09-13 DIAGNOSIS — Z79.899 OTHER LONG TERM (CURRENT) DRUG THERAPY: ICD-10-CM

## 2024-09-13 DIAGNOSIS — R10.9 FLANK PAIN: Primary | ICD-10-CM

## 2024-09-19 ENCOUNTER — PATIENT MESSAGE (OUTPATIENT)
Dept: PRIMARY CARE CLINIC | Facility: CLINIC | Age: 69
End: 2024-09-19
Payer: COMMERCIAL

## 2024-10-08 ENCOUNTER — OFFICE VISIT (OUTPATIENT)
Dept: PRIMARY CARE CLINIC | Facility: CLINIC | Age: 69
End: 2024-10-08
Payer: COMMERCIAL

## 2024-10-08 VITALS
DIASTOLIC BLOOD PRESSURE: 70 MMHG | SYSTOLIC BLOOD PRESSURE: 144 MMHG | HEIGHT: 61 IN | HEART RATE: 84 BPM | OXYGEN SATURATION: 93 % | BODY MASS INDEX: 35.67 KG/M2 | WEIGHT: 188.94 LBS | RESPIRATION RATE: 14 BRPM | TEMPERATURE: 97 F

## 2024-10-08 DIAGNOSIS — G89.29 CHRONIC LEFT FLANK PAIN: Primary | ICD-10-CM

## 2024-10-08 DIAGNOSIS — F32.1 CURRENT MODERATE EPISODE OF MAJOR DEPRESSIVE DISORDER WITHOUT PRIOR EPISODE: Chronic | ICD-10-CM

## 2024-10-08 DIAGNOSIS — F51.01 PRIMARY INSOMNIA: ICD-10-CM

## 2024-10-08 DIAGNOSIS — R10.9 CHRONIC LEFT FLANK PAIN: Primary | ICD-10-CM

## 2024-10-08 DIAGNOSIS — F41.1 GAD (GENERALIZED ANXIETY DISORDER): ICD-10-CM

## 2024-10-08 DIAGNOSIS — E66.812 OBESITY, CLASS II, BMI 35-39.9: ICD-10-CM

## 2024-10-08 LAB
BILIRUB UR QL STRIP: NEGATIVE
CLARITY UR: CLEAR
COLOR UR: YELLOW
GLUCOSE UR QL STRIP: NEGATIVE
HGB UR QL STRIP: ABNORMAL
KETONES UR QL STRIP: NEGATIVE
LEUKOCYTE ESTERASE UR QL STRIP: ABNORMAL
MICROSCOPIC COMMENT: ABNORMAL
NITRITE UR QL STRIP: NEGATIVE
PH UR STRIP: 6 [PH] (ref 5–8)
PROT UR QL STRIP: ABNORMAL
RBC #/AREA URNS HPF: 5 /HPF (ref 0–4)
SP GR UR STRIP: 1.02 (ref 1–1.03)
SQUAMOUS #/AREA URNS HPF: 2 /HPF
URN SPEC COLLECT METH UR: ABNORMAL
UROBILINOGEN UR STRIP-ACNC: NEGATIVE EU/DL
WBC #/AREA URNS HPF: 3 /HPF (ref 0–5)

## 2024-10-08 PROCEDURE — 3288F FALL RISK ASSESSMENT DOCD: CPT | Mod: CPTII,S$GLB,, | Performed by: STUDENT IN AN ORGANIZED HEALTH CARE EDUCATION/TRAINING PROGRAM

## 2024-10-08 PROCEDURE — 3044F HG A1C LEVEL LT 7.0%: CPT | Mod: CPTII,S$GLB,, | Performed by: STUDENT IN AN ORGANIZED HEALTH CARE EDUCATION/TRAINING PROGRAM

## 2024-10-08 PROCEDURE — 99214 OFFICE O/P EST MOD 30 MIN: CPT | Mod: S$GLB,,, | Performed by: STUDENT IN AN ORGANIZED HEALTH CARE EDUCATION/TRAINING PROGRAM

## 2024-10-08 PROCEDURE — 1160F RVW MEDS BY RX/DR IN RCRD: CPT | Mod: CPTII,S$GLB,, | Performed by: STUDENT IN AN ORGANIZED HEALTH CARE EDUCATION/TRAINING PROGRAM

## 2024-10-08 PROCEDURE — 3078F DIAST BP <80 MM HG: CPT | Mod: CPTII,S$GLB,, | Performed by: STUDENT IN AN ORGANIZED HEALTH CARE EDUCATION/TRAINING PROGRAM

## 2024-10-08 PROCEDURE — 3077F SYST BP >= 140 MM HG: CPT | Mod: CPTII,S$GLB,, | Performed by: STUDENT IN AN ORGANIZED HEALTH CARE EDUCATION/TRAINING PROGRAM

## 2024-10-08 PROCEDURE — 3008F BODY MASS INDEX DOCD: CPT | Mod: CPTII,S$GLB,, | Performed by: STUDENT IN AN ORGANIZED HEALTH CARE EDUCATION/TRAINING PROGRAM

## 2024-10-08 PROCEDURE — 1159F MED LIST DOCD IN RCRD: CPT | Mod: CPTII,S$GLB,, | Performed by: STUDENT IN AN ORGANIZED HEALTH CARE EDUCATION/TRAINING PROGRAM

## 2024-10-08 PROCEDURE — 1101F PT FALLS ASSESS-DOCD LE1/YR: CPT | Mod: CPTII,S$GLB,, | Performed by: STUDENT IN AN ORGANIZED HEALTH CARE EDUCATION/TRAINING PROGRAM

## 2024-10-08 PROCEDURE — 1126F AMNT PAIN NOTED NONE PRSNT: CPT | Mod: CPTII,S$GLB,, | Performed by: STUDENT IN AN ORGANIZED HEALTH CARE EDUCATION/TRAINING PROGRAM

## 2024-10-08 PROCEDURE — 99999 PR PBB SHADOW E&M-EST. PATIENT-LVL IV: CPT | Mod: PBBFAC,,, | Performed by: STUDENT IN AN ORGANIZED HEALTH CARE EDUCATION/TRAINING PROGRAM

## 2024-10-08 RX ORDER — TRAZODONE HYDROCHLORIDE 100 MG/1
100 TABLET ORAL NIGHTLY
Qty: 30 TABLET | Refills: 11 | Status: SHIPPED | OUTPATIENT
Start: 2024-10-08 | End: 2025-10-08

## 2024-10-08 RX ORDER — ALPRAZOLAM 1 MG/1
TABLET ORAL
Qty: 12 TABLET | Refills: 2 | Status: SHIPPED | OUTPATIENT
Start: 2024-10-08

## 2024-10-08 NOTE — PROGRESS NOTES
Subjective:           Patient ID: Verena Stephenson   Age:  68 y.o.  Sex: female     Chief Complaint:   Medication Refill      History of Present Illness:    Verena Stephenson is a 68 y.o. female who presents today with a chief complaint of Medication Refill  .    69yo female presenting for f/u appt.  Having some anxiety.     States that at work she drinks water but eats junk foods.  Then in the evening will do some chips or coke.    For meals has done toast, bread or grits pop-tart in AM.  Does bruner at times. Does not eat eggs - does not like to king them.  Will do them at times if scrambled.   Skips lunch at home.  Will snack at work through the day.  After work will do a supper that varies a bunch.      Has been having some chronic left flank pain.  States chiropracter states they did not see any stones on kidney, but has been months so did not expect it to be that.    Has been using Sertraline 200mg daily, state some days feels like it helps, but other days does not seem to work.     Is using Melatonin at night.     Review of Systems   Constitutional:  Positive for unexpected weight change. Negative for activity change.   HENT:  Negative for hearing loss, rhinorrhea and trouble swallowing.    Eyes:  Negative for discharge and visual disturbance.   Respiratory:  Negative for chest tightness and wheezing.    Cardiovascular:  Negative for chest pain and palpitations.   Gastrointestinal:  Negative for blood in stool, constipation, diarrhea and vomiting.   Endocrine: Negative for polydipsia and polyuria.   Genitourinary:  Positive for difficulty urinating. Negative for dysuria, hematuria and menstrual problem.   Musculoskeletal:  Negative for arthralgias, joint swelling and neck pain.   Neurological:  Negative for weakness and headaches.   Psychiatric/Behavioral:  Positive for dysphoric mood. Negative for confusion.            Objective:        Vitals:    10/08/24 0436 10/08/24 1542 10/08/24 1636   BP: (!) 144/70 (!)  "158/74 (!) 144/70   BP Location: Right arm Right arm Right arm   Patient Position: Sitting Sitting Sitting   Pulse:  84    Resp:  14    Temp:  96.7 °F (35.9 °C)    TempSrc:  Temporal    SpO2:  (!) 93%    Weight:  85.7 kg (188 lb 15 oz)    Height:  5' 1" (1.549 m)        Body mass index is 35.7 kg/m².      Physical Exam  Constitutional:       General: She is not in acute distress.     Appearance: Normal appearance. She is obese.      Comments: As per BMI.   HENT:      Head: Normocephalic.      Right Ear: External ear normal.      Left Ear: External ear normal.      Mouth/Throat:      Mouth: Mucous membranes are moist.   Eyes:      Extraocular Movements: Extraocular movements intact.      Conjunctiva/sclera: Conjunctivae normal.   Cardiovascular:      Rate and Rhythm: Normal rate.      Heart sounds:      No gallop.   Pulmonary:      Effort: Pulmonary effort is normal. No respiratory distress.      Breath sounds: Normal breath sounds.   Abdominal:      General: Abdomen is flat. Bowel sounds are normal. There is no distension.      Palpations: Abdomen is soft.   Musculoskeletal:         General: No swelling or deformity.      Right lower leg: No edema.      Left lower leg: No edema.   Lymphadenopathy:      Cervical: No cervical adenopathy.   Skin:     General: Skin is warm.      Capillary Refill: Capillary refill takes less than 2 seconds.      Coloration: Skin is not jaundiced.   Neurological:      General: No focal deficit present.      Mental Status: She is alert and oriented to person, place, and time.      Motor: No weakness.   Psychiatric:      Comments: Less upset today vs last appt, but still showing anxiety about her situation.           Past Medical History:   Diagnosis Date    Chronic constipation     Depression     Hypertension     Neuromuscular disorder        Lab Results   Component Value Date     09/27/2024    K 3.9 09/27/2024     09/27/2024    CO2 27 09/27/2024    BUN 12 09/27/2024    " "CREATININE 0.79 09/27/2024     Lab Results   Component Value Date    HGBA1C 5.7 (H) 09/27/2024     No results found for: "BNP", "BNPTRIAGEBLO"    Lab Results   Component Value Date    WBC 8.1 09/27/2024    HGB 15.0 09/27/2024    HCT 47.2 (H) 09/27/2024     09/27/2024     Lab Results   Component Value Date    CHOL 201 (H) 09/27/2024    HDL 45 (L) 09/27/2024    LDLCALC 131 (H) 09/27/2024    TRIG 142 09/27/2024        Outpatient Encounter Medications as of 10/8/2024   Medication Sig Dispense Refill    amLODIPine (NORVASC) 5 MG tablet Take 1 tablet (5 mg total) by mouth once daily. 90 tablet 3    gabapentin (NEURONTIN) 300 MG capsule Take 1 capsule (300 mg total) by mouth nightly as needed (muscle aches or pains). 90 capsule 3    melatonin 10 mg Tab Take 1 tablet by mouth nightly as needed.      omeprazole (PRILOSEC) 40 MG capsule Take 1 capsule (40 mg total) by mouth daily as needed (acid reflux). 90 capsule 3    sertraline (ZOLOFT) 100 MG tablet Take 2 tablets (200 mg total) by mouth once daily. 60 tablet 11    [DISCONTINUED] ALPRAZolam (XANAX) 1 MG tablet Take up to 2-3 tabs per week.  Up to 12 tabs for 1 month supply. 12 tablet 2    ALPRAZolam (XANAX) 1 MG tablet Take up to 2-3 tabs per week.  Up to 12 tabs for 1 month supply. 12 tablet 2    traZODone (DESYREL) 100 MG tablet Take 1 tablet (100 mg total) by mouth every evening. 30 tablet 11     No facility-administered encounter medications on file as of 10/8/2024.          Assessment:       1. Chronic left flank pain    2. Primary insomnia    3. JENNA (generalized anxiety disorder)    4. Current moderate episode of major depressive disorder without prior episode           Plan:           Chronic left flank pain  -     URINALYSIS   - left flank pain.   - tried to get check with labs, but was not collected.   - recheck now in clinic.   - tolerates exam well.     Primary insomnia  -     traZODone (DESYREL) 100 MG tablet; Take 1 tablet (100 mg total) by mouth every " "evening.  Dispense: 30 tablet; Refill: 11   - sleep issues.  Uses Melatonin.   - adding Trazodone 100mg daily.    JENNA (generalized anxiety disorder)  -     traZODone (DESYREL) 100 MG tablet; Take 1 tablet (100 mg total) by mouth every evening.  Dispense: 30 tablet; Refill: 11  -     ALPRAZolam (XANAX) 1 MG tablet; Take up to 2-3 tabs per week.  Up to 12 tabs for 1 month supply.  Dispense: 12 tablet; Refill: 2    Current moderate episode of major depressive disorder without prior episode  -     ALPRAZolam (XANAX) 1 MG tablet; Take up to 2-3 tabs per week.  Up to 12 tabs for 1 month supply.  Dispense: 12 tablet; Refill: 2   - uses intermittently, couple times per week.  Refilling at this time.    Other orders  -     Urinalysis Microscopic    Weight Loss:   - Body mass index is 35.7 kg/m².   - Normal weight is BMI 18-24.9.     - Overweight: 25-29.9  - Class 1 Obesity: 30-34.9  - Class 2 Obesity: 35-39.9  - Class 3 Obesity: 40+  - A BMI under 18 also shows diminished health outcomes.   - best health outcomes have been seen at a BMI of 22.    - excess weigh affects many body systems, including: cardiac, respiratory, GI, endocrine, musculoskeletal, dermatologic, reproductive, mental health and more.   - recommended moderate weight change, 1-2lbs per weeks.   - focus on eating a healthy sustainable diet.  Use food diary for at least a couple weeks to better understand what your diet.   - consider joey such as "Lose It" or "Noom".   - avoid empty calories that you may use daily from items such as like soda, sweet tea, sugary coffee, ice cream, cake/pie, cookies/brownies/crackers or candy.  An occasional piece of birthday cake is not the cause of obesity, but a daily Frappaccino could be to blame.    - "Low Fat" on a box or bag should be eyed with caution, this fat it typically replaced with forms of sugar/carbs and the resultant product may be less healthy than other products.   - when possible eating whole foods is almost " always preferable.  A diet of salads, green beans, broccoli, cauliflower, cucumbers, sweet potatoes, peppers, olives/avocados, tomatoes, beats, berries, eggs, meat/fish, shrimp/crawfish with some limited fruit (apples/oranges/bananas/grapes) and even more limited grains/starches (pasta/rice/bread/potatoes/cereal) will serve you much better in the long term than eating a bunch of diet bars, shakes or powders.     - Exercise has many benefits (heart health, improved mood/energy, higher self esteem, less depression, greater strength/flexibility, better sleep, less stress/anxiety, improved immune system, stronger bones, improved cognition, fewer colds/asthma exacerbations), it also does help lose weight.  But weight loss from exercise is much less impactful than when a change in diet can achieve.  Exercise is highly encouraged, but diet change should be the primary tool used to lose weight.

## 2024-10-09 NOTE — PATIENT INSTRUCTIONS
"    Chronic left flank pain  -     URINALYSIS   - left flank pain.   - tried to get check with labs, but was not collected.   - recheck now in clinic.   - tolerates exam well.     Primary insomnia  -     traZODone (DESYREL) 100 MG tablet; Take 1 tablet (100 mg total) by mouth every evening.  Dispense: 30 tablet; Refill: 11   - sleep issues.  Uses Melatonin.   - adding Trazodone 100mg daily.    JENNA (generalized anxiety disorder)  -     traZODone (DESYREL) 100 MG tablet; Take 1 tablet (100 mg total) by mouth every evening.  Dispense: 30 tablet; Refill: 11  -     ALPRAZolam (XANAX) 1 MG tablet; Take up to 2-3 tabs per week.  Up to 12 tabs for 1 month supply.  Dispense: 12 tablet; Refill: 2    Current moderate episode of major depressive disorder without prior episode  -     ALPRAZolam (XANAX) 1 MG tablet; Take up to 2-3 tabs per week.  Up to 12 tabs for 1 month supply.  Dispense: 12 tablet; Refill: 2    Other orders  -     Urinalysis Microscopic    Weight Loss:   - Body mass index is 35.7 kg/m².   - Normal weight is BMI 18-24.9.     - Overweight: 25-29.9  - Class 1 Obesity: 30-34.9  - Class 2 Obesity: 35-39.9  - Class 3 Obesity: 40+  - A BMI under 18 also shows diminished health outcomes.   - best health outcomes have been seen at a BMI of 22.    - excess weigh affects many body systems, including: cardiac, respiratory, GI, endocrine, musculoskeletal, dermatologic, reproductive, mental health and more.   - recommended moderate weight change, 1-2lbs per weeks.   - focus on eating a healthy sustainable diet.  Use food diary for at least a couple weeks to better understand what your diet.   - consider joey such as "Lose It" or "Noom".   - avoid empty calories that you may use daily from items such as like soda, sweet tea, sugary coffee, ice cream, cake/pie, cookies/brownies/crackers or candy.  An occasional piece of birthday cake is not the cause of obesity, but a daily Frappaccino could be to blame.    - "Low Fat" on a " box or bag should be eyed with caution, this fat it typically replaced with forms of sugar/carbs and the resultant product may be less healthy than other products.   - when possible eating whole foods is almost always preferable.  A diet of salads, green beans, broccoli, cauliflower, cucumbers, sweet potatoes, peppers, olives/avocados, tomatoes, beats, berries, eggs, meat/fish, shrimp/crawfish with some limited fruit (apples/oranges/bananas/grapes) and even more limited grains/starches (pasta/rice/bread/potatoes/cereal) will serve you much better in the long term than eating a bunch of diet bars, shakes or powders.     - Exercise has many benefits (heart health, improved mood/energy, higher self esteem, less depression, greater strength/flexibility, better sleep, less stress/anxiety, improved immune system, stronger bones, improved cognition, fewer colds/asthma exacerbations), it also does help lose weight.  But weight loss from exercise is much less impactful than when a change in diet can achieve.  Exercise is highly encouraged, but diet change should be the primary tool used to lose weight.

## 2024-10-12 ENCOUNTER — PATIENT MESSAGE (OUTPATIENT)
Dept: PRIMARY CARE CLINIC | Facility: CLINIC | Age: 69
End: 2024-10-12
Payer: COMMERCIAL

## 2024-12-06 DIAGNOSIS — F32.1 CURRENT MODERATE EPISODE OF MAJOR DEPRESSIVE DISORDER WITHOUT PRIOR EPISODE: ICD-10-CM

## 2024-12-06 NOTE — TELEPHONE ENCOUNTER
No care due was identified.  Health [facility] Embedded Care Due Messages. Reference number: [id number].   12/06/2024 11:50:37 AM CST

## 2024-12-07 RX ORDER — SERTRALINE HYDROCHLORIDE 100 MG/1
200 TABLET, FILM COATED ORAL DAILY
Qty: 180 TABLET | Refills: 3 | Status: SHIPPED | OUTPATIENT
Start: 2024-12-07

## 2024-12-09 DIAGNOSIS — F41.1 GAD (GENERALIZED ANXIETY DISORDER): ICD-10-CM

## 2024-12-09 DIAGNOSIS — F32.1 CURRENT MODERATE EPISODE OF MAJOR DEPRESSIVE DISORDER WITHOUT PRIOR EPISODE: Chronic | ICD-10-CM

## 2024-12-09 RX ORDER — ALPRAZOLAM 1 MG/1
TABLET ORAL
Qty: 12 TABLET | Refills: 2 | OUTPATIENT
Start: 2024-12-09

## 2024-12-09 NOTE — TELEPHONE ENCOUNTER
No care due was identified.  Health [facility] Embedded Care Due Messages. Reference number: [id number].   12/09/2024 9:02:16 AM CST

## 2024-12-19 ENCOUNTER — PATIENT MESSAGE (OUTPATIENT)
Dept: ADMINISTRATIVE | Facility: HOSPITAL | Age: 69
End: 2024-12-19
Payer: COMMERCIAL

## 2025-01-15 ENCOUNTER — OFFICE VISIT (OUTPATIENT)
Dept: PRIMARY CARE CLINIC | Facility: CLINIC | Age: 70
End: 2025-01-15
Payer: COMMERCIAL

## 2025-01-15 VITALS
HEART RATE: 76 BPM | DIASTOLIC BLOOD PRESSURE: 64 MMHG | WEIGHT: 182.56 LBS | SYSTOLIC BLOOD PRESSURE: 136 MMHG | OXYGEN SATURATION: 97 % | BODY MASS INDEX: 34.47 KG/M2 | RESPIRATION RATE: 19 BRPM | HEIGHT: 61 IN

## 2025-01-15 DIAGNOSIS — F41.1 GAD (GENERALIZED ANXIETY DISORDER): Primary | ICD-10-CM

## 2025-01-15 DIAGNOSIS — F32.1 CURRENT MODERATE EPISODE OF MAJOR DEPRESSIVE DISORDER WITHOUT PRIOR EPISODE: ICD-10-CM

## 2025-01-15 DIAGNOSIS — E66.811 OBESITY (BMI 30.0-34.9): ICD-10-CM

## 2025-01-15 DIAGNOSIS — M25.841 SESAMOIDITIS OF RIGHT HAND: ICD-10-CM

## 2025-01-15 PROCEDURE — 3078F DIAST BP <80 MM HG: CPT | Mod: CPTII,S$GLB,, | Performed by: STUDENT IN AN ORGANIZED HEALTH CARE EDUCATION/TRAINING PROGRAM

## 2025-01-15 PROCEDURE — 99214 OFFICE O/P EST MOD 30 MIN: CPT | Mod: S$GLB,,, | Performed by: STUDENT IN AN ORGANIZED HEALTH CARE EDUCATION/TRAINING PROGRAM

## 2025-01-15 PROCEDURE — 3008F BODY MASS INDEX DOCD: CPT | Mod: CPTII,S$GLB,, | Performed by: STUDENT IN AN ORGANIZED HEALTH CARE EDUCATION/TRAINING PROGRAM

## 2025-01-15 PROCEDURE — 3288F FALL RISK ASSESSMENT DOCD: CPT | Mod: CPTII,S$GLB,, | Performed by: STUDENT IN AN ORGANIZED HEALTH CARE EDUCATION/TRAINING PROGRAM

## 2025-01-15 PROCEDURE — 1101F PT FALLS ASSESS-DOCD LE1/YR: CPT | Mod: CPTII,S$GLB,, | Performed by: STUDENT IN AN ORGANIZED HEALTH CARE EDUCATION/TRAINING PROGRAM

## 2025-01-15 PROCEDURE — 99999 PR PBB SHADOW E&M-EST. PATIENT-LVL IV: CPT | Mod: PBBFAC,,, | Performed by: STUDENT IN AN ORGANIZED HEALTH CARE EDUCATION/TRAINING PROGRAM

## 2025-01-15 PROCEDURE — 1126F AMNT PAIN NOTED NONE PRSNT: CPT | Mod: CPTII,S$GLB,, | Performed by: STUDENT IN AN ORGANIZED HEALTH CARE EDUCATION/TRAINING PROGRAM

## 2025-01-15 PROCEDURE — 1160F RVW MEDS BY RX/DR IN RCRD: CPT | Mod: CPTII,S$GLB,, | Performed by: STUDENT IN AN ORGANIZED HEALTH CARE EDUCATION/TRAINING PROGRAM

## 2025-01-15 PROCEDURE — 3075F SYST BP GE 130 - 139MM HG: CPT | Mod: CPTII,S$GLB,, | Performed by: STUDENT IN AN ORGANIZED HEALTH CARE EDUCATION/TRAINING PROGRAM

## 2025-01-15 PROCEDURE — 1159F MED LIST DOCD IN RCRD: CPT | Mod: CPTII,S$GLB,, | Performed by: STUDENT IN AN ORGANIZED HEALTH CARE EDUCATION/TRAINING PROGRAM

## 2025-01-15 RX ORDER — ALPRAZOLAM 1 MG/1
TABLET ORAL
Qty: 12 TABLET | Refills: 2 | Status: SHIPPED | OUTPATIENT
Start: 2025-01-15 | End: 2025-01-15 | Stop reason: SDUPTHER

## 2025-01-15 RX ORDER — VENLAFAXINE HYDROCHLORIDE 37.5 MG/1
CAPSULE, EXTENDED RELEASE ORAL
Qty: 150 CAPSULE | Refills: 0 | Status: SHIPPED | OUTPATIENT
Start: 2025-01-15 | End: 2025-04-15

## 2025-01-15 RX ORDER — ALPRAZOLAM 1 MG/1
TABLET ORAL
Qty: 12 TABLET | Refills: 2 | Status: SHIPPED | OUTPATIENT
Start: 2025-01-15

## 2025-01-15 NOTE — PROGRESS NOTES
Assessment:       1. JENNA (generalized anxiety disorder)    2. Current moderate episode of major depressive disorder without prior episode    3. Obesity (BMI 30.0-34.9)    4. Sesamoiditis of right hand           Plan:     Assessment & Plan    IMPRESSION:  - Will switch patient from sertraline to venlafaxine due to inadequate response to current regimen  - Chose venlafaxine as next option given patient's history of trials with multiple SSRIs and SNRIs  - Acknowledged venlafaxine's potency and potential challenges with discontinuation, necessitating a careful titration plan  - Considered patient's age and kidney/liver function in determining dosing strategy  - Noted pre-diabetic status (A1C 5.7%) but deemed current level not severe enough to warrant metformin initiation    DEPRESSION:  - Evaluated the patient's current depressive symptoms, which have been ongoing since Thanksgiving and [place].  - Assessed the efficacy of current medication (Sertraline 200mg), which is not providing adequate relief.  - Reviewed patient's history of antidepressant use, including Lexapro, Prozac, Cymbalta, and Wellbutrin, which were not helpful.  - Explained differences between venlafaxine and previously tried antidepressants.  - Discussed potential side effects of venlafaxine, including weight loss (8%), dry mouth (15%), drowsiness (15%), insomnia (17%), and nausea (25-30%).  - Prescribed venlafaxine (Effexor) 37.5 mg daily for 30 days.  - Decreased sertraline (Zoloft) from 200 mg to 100 mg daily for 30 days.  - Instructed to increase venlafaxine to 75 mg daily (two 37.5 mg tablets) and decrease sertraline to 50 mg daily for 30 days after the initial 30-day period.  - Advised to discontinue sertraline and continue venlafaxine 75 mg daily after 60 days.  - Scheduled follow-up after starting new medication regimen to assess effectiveness and tolerability.    ANXIETY:  - Noted that the patient is currently using alprazolam as needed  for anxiety, which has been helpful.  - Noted that the patient reports using alprazolam as needed to manage anxiety symptoms.  - Explained differences between venlafaxine and previously tried antidepressants, emphasizing its effectiveness for both depression and anxiety.    PREDIABETES:  - Evaluated the patient's A1C level of 5.7%, indicating prediabetes.  - Assessed the A1C level as not severe, but still in the prediabetic range.  - Discussed dietary modifications to manage prediabetes, focusing on reducing sugar and carbohydrate intake, especially from fruits, starches, and processed foods.  - Provided information on dietary changes to manage pre-diabetes.  - Considered metformin as a potential future treatment option if needed, but not currently prescribed due to potential side effects.  - Verena to watch sugar and carbohydrate intake, especially elevated-sugar fruits (grapes, pineapple, bananas) and starches (rice, pasta, bread, potatoes, corn).  - Recommend increasing protein intake when consuming carbohydrates.  - Verena to consider portion control for carbohydrate-rich foods.  - Recommend opting for lower-sugar fruits like berries and melons.    SCOLIOSIS:  - Monitored the patient's ongoing back issues related to scoliosis, with occasional pain that may affect the kidney area.  - Noted that scoliosis can worsen with age, but it's not necessarily the case, as the condition is influenced by back musculature.  - Recommend further evaluation by an orthopedic surgeon, including X-rays of the back.  - Noted that the patient reports experiencing more aches and pains as they age, possibly related to scoliosis.    OTHER INSTRUCTIONS:  - Acknowledged the recent death of mother-in-law due to stomach cancer.             JENNA (generalized anxiety disorder)  -     Discontinue: ALPRAZolam (XANAX) 1 MG tablet; Take up to 2-3 tabs per week.  Up to 12 tabs for 1 month supply.  Dispense: 12 tablet; Refill: 2  -      ALPRAZolam (XANAX) 1 MG tablet; Take up to 2-3 tabs per week.  Up to 12 tabs for 1 month supply.  Dispense: 12 tablet; Refill: 2  -     venlafaxine (EFFEXOR-XR) 37.5 MG 24 hr capsule; Take 1 capsule (37.5 mg total) by mouth once daily for 30 days, THEN 2 capsules (75 mg total) once daily.  Dispense: 150 capsule; Refill: 0    Current moderate episode of major depressive disorder without prior episode  -     Discontinue: ALPRAZolam (XANAX) 1 MG tablet; Take up to 2-3 tabs per week.  Up to 12 tabs for 1 month supply.  Dispense: 12 tablet; Refill: 2  -     ALPRAZolam (XANAX) 1 MG tablet; Take up to 2-3 tabs per week.  Up to 12 tabs for 1 month supply.  Dispense: 12 tablet; Refill: 2  -     venlafaxine (EFFEXOR-XR) 37.5 MG 24 hr capsule; Take 1 capsule (37.5 mg total) by mouth once daily for 30 days, THEN 2 capsules (75 mg total) once daily.  Dispense: 150 capsule; Refill: 0    Obesity (BMI 30.0-34.9)    Sesamoiditis of right hand  Comments:  base of right thumb.                This note was generated with the assistance of ambient listening technology. Verbal consent was obtained by the patient and accompanying visitor(s) for the recording of patient appointment to facilitate this note. I attest to having reviewed and edited the generated note for accuracy, though some syntax or spelling errors may persist. Please contact the author of this note for any clarification.      Subjective:           Patient ID: Verena Stephenson   Age:  69 y.o.  Sex: female     Chief Complaint:   Follow-up (3 month )      History of Present Illness:    Verena Stephenson is a 69 y.o. female who presents today with a chief complaint of Follow-up (3 month )  .    History of Present Illness    CHIEF COMPLAINT:  Verena presents today for follow-up and medication adjustment.    DEPRESSION AND ANXIETY:  She reports Sertraline is currently ineffective for depression management. Previous trials of SSRIs including Prozac, Lexapro, and Cymbalta were  not helpful. Wellbutrin 150 mg daily was also ineffective. She uses Xanax 0.5 mg as needed for anxiety symptoms, which she tries to limit.    NEUROLOGICAL SYMPTOMS:  She reports memory issues and family members have noticed she is stumbling over words, which has been concerning enough to prompt medical attention.    PRE-DIABETES:  She has been pre-diabetic for over three years with most recent A1C of 5.7%. While this level is not severely elevated, she is motivated to prevent progression to diabetes and is receptive to dietary modifications.    MUSCULOSKELETAL:  She has a history of scoliosis with increasing back pain and discomfort with age. She occasionally experiences tingling sensation in the kidney area, which she attributes to her back problems.    FAMILY HISTORY:  Mother-in-law  from stomach cancer in January.      ROS:  Musculoskeletal: +back pain  Neurological: +tingling  Psychiatric: +anxiety, +depression, +memory problems           Review of Systems   Constitutional:  Positive for unexpected weight change. Negative for activity change.   HENT:  Negative for hearing loss, rhinorrhea and trouble swallowing.    Eyes:  Negative for discharge and visual disturbance.   Respiratory:  Negative for chest tightness and wheezing.    Cardiovascular:  Negative for chest pain and palpitations.   Gastrointestinal:  Negative for blood in stool, constipation, diarrhea and vomiting.   Endocrine: Negative for polydipsia and polyuria.   Genitourinary:  Positive for difficulty urinating. Negative for dysuria, hematuria and menstrual problem.   Musculoskeletal:  Negative for arthralgias, joint swelling and neck pain.   Neurological:  Negative for weakness and headaches.   Psychiatric/Behavioral:  Positive for decreased concentration, dysphoric mood and sleep disturbance. Negative for confusion.            Objective:        Vitals:    01/15/25 1522 01/15/25 1544   BP: (!) 146/62 136/64   BP Location: Right arm Left arm  "  Patient Position: Sitting Sitting   Pulse: 76    Resp: 19    SpO2: 97%    Weight: 82.8 kg (182 lb 8.7 oz)    Height: 5' 1" (1.549 m)        Body mass index is 34.49 kg/m².      Physical Exam  Constitutional:       General: She is not in acute distress.     Appearance: Normal appearance. She is obese.      Comments: As per BMI.   HENT:      Head: Normocephalic.      Right Ear: External ear normal.      Left Ear: External ear normal.      Mouth/Throat:      Mouth: Mucous membranes are moist.   Eyes:      Extraocular Movements: Extraocular movements intact.      Conjunctiva/sclera: Conjunctivae normal.   Cardiovascular:      Rate and Rhythm: Normal rate.      Heart sounds:      No gallop.   Pulmonary:      Effort: Pulmonary effort is normal. No respiratory distress.      Breath sounds: Normal breath sounds.   Abdominal:      General: Abdomen is flat. Bowel sounds are normal. There is no distension.      Palpations: Abdomen is soft.   Musculoskeletal:         General: No swelling or deformity.      Right lower leg: No edema.      Left lower leg: No edema.   Lymphadenopathy:      Cervical: No cervical adenopathy.   Skin:     General: Skin is warm.      Capillary Refill: Capillary refill takes less than 2 seconds.      Coloration: Skin is not jaundiced.   Neurological:      General: No focal deficit present.      Mental Status: She is alert and oriented to person, place, and time.      Motor: No weakness.   Psychiatric:      Comments: States that holiday season was stressful.    Seems higher stress than last appt.          Physical Exam                    Past Medical History:   Diagnosis Date    Chronic constipation     Depression     Hypertension     Neuromuscular disorder        Lab Results   Component Value Date     09/27/2024    K 3.9 09/27/2024     09/27/2024    CO2 27 09/27/2024    BUN 12 09/27/2024    CREATININE 0.79 09/27/2024     Lab Results   Component Value Date    HGBA1C 5.7 (H) 09/27/2024 " "    No results found for: "BNP", "BNPTRIAGEBLO"    Lab Results   Component Value Date    WBC 8.1 09/27/2024    HGB 15.0 09/27/2024    HCT 47.2 (H) 09/27/2024     09/27/2024     Lab Results   Component Value Date    CHOL 201 (H) 09/27/2024    HDL 45 (L) 09/27/2024    LDLCALC 131 (H) 09/27/2024    TRIG 142 09/27/2024        Outpatient Encounter Medications as of 1/15/2025   Medication Sig Dispense Refill    amLODIPine (NORVASC) 5 MG tablet Take 1 tablet (5 mg total) by mouth once daily. 90 tablet 3    gabapentin (NEURONTIN) 300 MG capsule Take 1 capsule (300 mg total) by mouth nightly as needed (muscle aches or pains). 90 capsule 3    melatonin 10 mg Tab Take 1 tablet by mouth nightly as needed.      omeprazole (PRILOSEC) 40 MG capsule Take 1 capsule (40 mg total) by mouth daily as needed (acid reflux). 90 capsule 3    traZODone (DESYREL) 100 MG tablet Take 1 tablet (100 mg total) by mouth every evening. 30 tablet 11    [DISCONTINUED] ALPRAZolam (XANAX) 1 MG tablet Take up to 2-3 tabs per week.  Up to 12 tabs for 1 month supply. 12 tablet 2    [DISCONTINUED] sertraline (ZOLOFT) 100 MG tablet Take 2 tablets (200 mg total) by mouth once daily. 180 tablet 3    ALPRAZolam (XANAX) 1 MG tablet Take up to 2-3 tabs per week.  Up to 12 tabs for 1 month supply. 12 tablet 2    venlafaxine (EFFEXOR-XR) 37.5 MG 24 hr capsule Take 1 capsule (37.5 mg total) by mouth once daily for 30 days, THEN 2 capsules (75 mg total) once daily. 150 capsule 0    [DISCONTINUED] ALPRAZolam (XANAX) 1 MG tablet Take up to 2-3 tabs per week.  Up to 12 tabs for 1 month supply. 12 tablet 2     No facility-administered encounter medications on file as of 1/15/2025.            "

## 2025-01-15 NOTE — Clinical Note
Mrs. Hartley has a possible sesamoiditis to base of right thumb, and the right shoulder is also giving pain.  She got an appt with you on Tue 1/23/24 to discuss. Do you need images before for those? She was hoping to get a steroid injection at the appt if indicated.   Thanks, SB

## 2025-01-15 NOTE — PATIENT INSTRUCTIONS
IMPRESSION:  - Will switch patient from sertraline to venlafaxine due to inadequate response to current regimen  - Chose venlafaxine as next option given patient's history of trials with multiple SSRIs and SNRIs  - Acknowledged venlafaxine's potency and potential challenges with discontinuation, necessitating a careful titration plan  - Considered patient's age and kidney/liver function in determining dosing strategy  - Noted pre-diabetic status (A1C 5.7%) but deemed current level not severe enough to warrant metformin initiation    DEPRESSION:  - Evaluated the patient's current depressive symptoms, which have been ongoing since Thanksgiving and [place].  - Assessed the efficacy of current medication (Sertraline 200mg), which is not providing adequate relief.  - Reviewed patient's history of antidepressant use, including Lexapro, Prozac, Cymbalta, and Wellbutrin, which were not helpful.  - Explained differences between venlafaxine and previously tried antidepressants.  - Discussed potential side effects of venlafaxine, including weight loss (8%), dry mouth (15%), drowsiness (15%), insomnia (17%), and nausea (25-30%).  - Prescribed venlafaxine (Effexor) 37.5 mg daily for 30 days.  - Decreased sertraline (Zoloft) from 200 mg to 100 mg daily for 30 days.  - Instructed to increase venlafaxine to 75 mg daily (two 37.5 mg tablets) and decrease sertraline to 50 mg daily for 30 days after the initial 30-day period.  - Advised to discontinue sertraline and continue venlafaxine 75 mg daily after 60 days.  - Scheduled follow-up after starting new medication regimen to assess effectiveness and tolerability.    ANXIETY:  - Noted that the patient is currently using alprazolam as needed for anxiety, which has been helpful.  - Noted that the patient reports using alprazolam as needed to manage anxiety symptoms.  - Explained differences between venlafaxine and previously tried antidepressants, emphasizing its effectiveness for  both depression and anxiety.    PREDIABETES:  - Evaluated the patient's A1C level of 5.7%, indicating prediabetes.  - Assessed the A1C level as not severe, but still in the prediabetic range.  - Discussed dietary modifications to manage prediabetes, focusing on reducing sugar and carbohydrate intake, especially from fruits, starches, and processed foods.  - Provided information on dietary changes to manage pre-diabetes.  - Considered metformin as a potential future treatment option if needed, but not currently prescribed due to potential side effects.  - Verena to watch sugar and carbohydrate intake, especially elevated-sugar fruits (grapes, pineapple, bananas) and starches (rice, pasta, bread, potatoes, corn).  - Recommend increasing protein intake when consuming carbohydrates.  - Verena to consider portion control for carbohydrate-rich foods.  - Recommend opting for lower-sugar fruits like berries and melons.    SCOLIOSIS:  - Monitored the patient's ongoing back issues related to scoliosis, with occasional pain that may affect the kidney area.  - Noted that scoliosis can worsen with age, but it's not necessarily the case, as the condition is influenced by back musculature.  - Recommend further evaluation by an orthopedic surgeon, including X-rays of the back.  - Noted that the patient reports experiencing more aches and pains as they age, possibly related to scoliosis.    OTHER INSTRUCTIONS:  - Acknowledged the recent death of mother-in-law due to stomach cancer.

## 2025-01-16 DIAGNOSIS — M79.641 RIGHT HAND PAIN: Primary | ICD-10-CM

## 2025-01-27 ENCOUNTER — TELEPHONE (OUTPATIENT)
Dept: ORTHOPEDICS | Facility: CLINIC | Age: 70
End: 2025-01-27
Payer: COMMERCIAL

## 2025-01-28 ENCOUNTER — OFFICE VISIT (OUTPATIENT)
Dept: ORTHOPEDICS | Facility: CLINIC | Age: 70
End: 2025-01-28
Payer: COMMERCIAL

## 2025-01-28 VITALS
HEIGHT: 61 IN | HEART RATE: 68 BPM | DIASTOLIC BLOOD PRESSURE: 63 MMHG | SYSTOLIC BLOOD PRESSURE: 127 MMHG | WEIGHT: 182.56 LBS | BODY MASS INDEX: 34.47 KG/M2

## 2025-01-28 DIAGNOSIS — M65.311 TRIGGER THUMB OF RIGHT HAND: Primary | ICD-10-CM

## 2025-01-28 PROCEDURE — 99203 OFFICE O/P NEW LOW 30 MIN: CPT | Mod: 25,S$GLB,,

## 2025-01-28 PROCEDURE — 3008F BODY MASS INDEX DOCD: CPT | Mod: CPTII,S$GLB,,

## 2025-01-28 PROCEDURE — 99999 PR PBB SHADOW E&M-EST. PATIENT-LVL III: CPT | Mod: PBBFAC,,,

## 2025-01-28 PROCEDURE — 1159F MED LIST DOCD IN RCRD: CPT | Mod: CPTII,S$GLB,,

## 2025-01-28 PROCEDURE — 20550 NJX 1 TENDON SHEATH/LIGAMENT: CPT | Mod: RT,S$GLB,,

## 2025-01-28 PROCEDURE — 3074F SYST BP LT 130 MM HG: CPT | Mod: CPTII,S$GLB,,

## 2025-01-28 PROCEDURE — 3078F DIAST BP <80 MM HG: CPT | Mod: CPTII,S$GLB,,

## 2025-01-28 PROCEDURE — 1125F AMNT PAIN NOTED PAIN PRSNT: CPT | Mod: CPTII,S$GLB,,

## 2025-01-28 RX ORDER — DEXAMETHASONE SODIUM PHOSPHATE 4 MG/ML
4 INJECTION, SOLUTION INTRA-ARTICULAR; INTRALESIONAL; INTRAMUSCULAR; INTRAVENOUS; SOFT TISSUE
Status: COMPLETED | OUTPATIENT
Start: 2025-01-28 | End: 2025-01-28

## 2025-01-28 RX ADMIN — DEXAMETHASONE SODIUM PHOSPHATE 4 MG: 4 INJECTION, SOLUTION INTRA-ARTICULAR; INTRALESIONAL; INTRAMUSCULAR; INTRAVENOUS; SOFT TISSUE at 03:01

## 2025-01-28 NOTE — PROGRESS NOTES
SUBJECTIVE:      Chief Complaint: right thumb pain    History of Present Illness:  Patient is a 69 y.o. right hand dominant female who presents today with complaints of right thumb pain. Patient denies known BEENA. States has been ongoing for a few months. She noticed it has been locking a lot. Difficulty with gripping a pencil. Pain at base of thumb. States typing worsens. No consistenty numbness, but sometimes when she type.  Also notes intermittent right shoulder pain.  Worse when sleeping however this has not recently bothered her.  She denies any known mechanism of injury.  Pain located to anterior shoulder/biceps.    The patient is a/an [employer] employee.    Onset of symptoms/DOI was few months.    Symptoms are aggravated by typing and gripping pencil.    Symptoms are alleviated by rest.    Symptoms consist of pain and locking .    The patient rates their pain as a 8/10.    Attempted treatment(s) and/or interventions include activity modifications, rest.     The patient denies any fevers, chills, N/V, D/C and presents for evaluation.       Past Medical History:   Diagnosis Date    Chronic constipation     Depression     Hypertension     Neuromuscular disorder      Past Surgical History:   Procedure Laterality Date    CHOLECYSTECTOMY      TUBAL LIGATION      WISDOM TOOTH EXTRACTION       Review of patient's allergies indicates:  No Known Allergies  Social History     Social History Narrative    Not on file     Family History   Problem Relation Name Age of Onset    Diabetes Mother          boarderline    Heart disease Father      Hypertension Father      COPD Father      Emphysema Father          heavy smoker    Stroke Brother [phone]    Breast cancer Cousin      Colon cancer Neg Hx      Ovarian cancer Neg Hx      Lung cancer Neg Hx      Heart attack Neg Hx           Current Outpatient Medications:     ALPRAZolam (XANAX) 1 MG tablet, Take up to 2-3 tabs per week.  Up to 12 tabs for 1 month supply.,  Disp: 12 tablet, Rfl: 2    amLODIPine (NORVASC) 5 MG tablet, Take 1 tablet (5 mg total) by mouth once daily., Disp: 90 tablet, Rfl: 3    gabapentin (NEURONTIN) 300 MG capsule, Take 1 capsule (300 mg total) by mouth nightly as needed (muscle aches or pains)., Disp: 90 capsule, Rfl: 3    melatonin 10 mg Tab, Take 1 tablet by mouth nightly as needed., Disp: , Rfl:     omeprazole (PRILOSEC) 40 MG capsule, Take 1 capsule (40 mg total) by mouth daily as needed (acid reflux)., Disp: 90 capsule, Rfl: 3    traZODone (DESYREL) 100 MG tablet, Take 1 tablet (100 mg total) by mouth every evening., Disp: 30 tablet, Rfl: 11    venlafaxine (EFFEXOR-XR) 37.5 MG 24 hr capsule, Take 1 capsule (37.5 mg total) by mouth once daily for 30 days, THEN 2 capsules (75 mg total) once daily., Disp: 150 capsule, Rfl: 0      Review of Systems:  Constitutional: no fever or chills  Eyes: no visual changes  ENT: no nasal congestion or sore throat  Respiratory: no cough or shortness of breath  Cardiovascular: no chest pain  Gastrointestinal: no nausea or vomiting, tolerating diet  Musculoskeletal: pain    OBJECTIVE:      Vital Signs (Most Recent):  There were no vitals filed for this visit.  There is no height or weight on file to calculate BMI.      Physical Exam:  Constitutional: The patient appears well-developed and well-nourished. No distress.   Skin: No lesions appreciated  Head: Normocephalic and atraumatic.   Nose: Nose normal.   Ears: No deformities seen  Eyes: Conjunctivae and EOM are normal.   Neck: No tracheal deviation present.   Cardiovascular: Normal rate and intact distal pulses.    Pulmonary/Chest: Effort normal. No respiratory distress.   Abdominal: There is no guarding.   Neurological: The patient is alert.   Psychiatric: The patient has a normal mood and affect.     Right Hand/Wrist Examination:    Observation/Inspection:  Swelling  none    Deformity  none  Discoloration  none     Scars   none    Atrophy  none    HAND/WRIST  EXAMINATION:  Finkelstein's Test   Neg  WHAT Test    Neg  Snuff box tenderness   Neg  Muhammad's Test    Neg  Hook of Hamate Tenderness  Neg  CMC grind    Neg  Circumduction test   Neg  TTP     thumb A1 pulley    Neurovascular Exam:  Digits WWP, brisk CR < 3s throughout  NVI motor/LTS to M/R/U nerves, radial pulse 2+  Tinel's Test - Carpal Tunnel  Neg  Tinel's Test - Cubital Tunnel  Neg  Phalen's Test    Neg  Median Nerve Compression Test Neg  AIN Intact (O-Tess), PIN Intact (Thumbs Up), Ulnar Intact (scissors)    ROM hand full, positive locking noted on exam with some flexion    ROM wrist full, painless    ROM elbow full, painless    Abdomen not guarded  Respirations nonlabored  Perfusion intact    Diagnostic Results:     Imaging - I independently viewed the patient's imaging as well as the radiology report.  Xrays of the patient's right thumb  demonstrate no evidence of any obvious acute fractures or dislocations or significant degenerative changes.      ASSESSMENT/PLAN:      1. Trigger thumb of right hand  dexAMETHasone injection 4 mg    right trigger thumb #1            I made the decision to obtain old records of the patient including previous notes and imaging. If new imaging was ordered today of the extremity or extremities evaluated. I independently reviewed and interpreted the x-ray as well as prior imaging. Reviewed imaging in detail with patient.     We discussed at length different treatment options including conservative vs surgical management. These include anti-inflammatories, acetaminophen, rest, ice, heat, formal physical therapy including strengthening and stretching exercises, home exercise programs, injections, and finally surgical intervention.      Patient here for right trigger thumb.  We discussed treatment options in detail.  Patient elected to proceed with trigger thumb injection today, injection given.  Post-injection instructions reviewed.  Regarding her shoulder, she will see how this  continues to improve and we may consider injection at next visit.  Certainly could also be biceps tenosynovitis and could trial biceps tendon sheath injection.  She was also given oval 8 splint for her thumb.  She will follow up in 6 weeks.  Discussed 2 injections per finger per lifetime.    Follow up:  6 weeks  Xrays needed: none     All of the patient's questions were answered and the patient will contact us if they have any questions or concerns in the interim.

## 2025-01-28 NOTE — PROCEDURES
Right trigger thumb #1    Date/Time: 1/28/2025 3:00 PM    Performed by: Adry Ramsey PA-C  Authorized by: Adry Ramsey PA-C    Consent Done?:  Yes (Verbal)  Indications:  Pain  Site marked: the procedure site was marked    Timeout: prior to procedure the correct patient, procedure, and site was verified    Prep: patient was prepped and draped in usual sterile fashion      Local anesthesia used?: Yes    Local anesthetic:  Topical anesthetic and bupivacaine 0.25% without epinephrine  Anesthetic total (ml):  1    Location:  Thumb  Site:  R thumb flexor tendon sheath  Ultrasonic guidance for needle placement?: No    Needle size:  25 G  Approach:  Volar  Medications:  4 mg dexAMETHasone (DECADRON) injection 4 mg/mL  Patient tolerance:  Patient tolerated the procedure well with no immediate complications

## 2025-01-30 ENCOUNTER — CLINICAL SUPPORT (OUTPATIENT)
Dept: ORTHOPEDICS | Facility: CLINIC | Age: 70
End: 2025-01-30
Payer: COMMERCIAL

## 2025-01-30 DIAGNOSIS — M65.311 TRIGGER THUMB OF RIGHT HAND: Primary | ICD-10-CM

## 2025-03-19 ENCOUNTER — OFFICE VISIT (OUTPATIENT)
Dept: PRIMARY CARE CLINIC | Facility: CLINIC | Age: 70
End: 2025-03-19
Payer: COMMERCIAL

## 2025-03-19 VITALS
OXYGEN SATURATION: 96 % | BODY MASS INDEX: 36.12 KG/M2 | HEIGHT: 60 IN | HEART RATE: 70 BPM | SYSTOLIC BLOOD PRESSURE: 122 MMHG | WEIGHT: 184 LBS | DIASTOLIC BLOOD PRESSURE: 70 MMHG

## 2025-03-19 DIAGNOSIS — E66.812 OBESITY, CLASS II, BMI 35-39.9: ICD-10-CM

## 2025-03-19 DIAGNOSIS — M79.10 MUSCLE PAIN: ICD-10-CM

## 2025-03-19 DIAGNOSIS — F51.01 PRIMARY INSOMNIA: ICD-10-CM

## 2025-03-19 DIAGNOSIS — F32.1 CURRENT MODERATE EPISODE OF MAJOR DEPRESSIVE DISORDER WITHOUT PRIOR EPISODE: ICD-10-CM

## 2025-03-19 DIAGNOSIS — I10 ESSENTIAL HYPERTENSION: Primary | Chronic | ICD-10-CM

## 2025-03-19 DIAGNOSIS — F41.1 GAD (GENERALIZED ANXIETY DISORDER): ICD-10-CM

## 2025-03-19 DIAGNOSIS — R20.0 BILATERAL HAND NUMBNESS: ICD-10-CM

## 2025-03-19 DIAGNOSIS — M54.2 CERVICAL PAIN (NECK): ICD-10-CM

## 2025-03-19 PROCEDURE — 3078F DIAST BP <80 MM HG: CPT | Mod: CPTII,S$GLB,, | Performed by: STUDENT IN AN ORGANIZED HEALTH CARE EDUCATION/TRAINING PROGRAM

## 2025-03-19 PROCEDURE — 3008F BODY MASS INDEX DOCD: CPT | Mod: CPTII,S$GLB,, | Performed by: STUDENT IN AN ORGANIZED HEALTH CARE EDUCATION/TRAINING PROGRAM

## 2025-03-19 PROCEDURE — 3288F FALL RISK ASSESSMENT DOCD: CPT | Mod: CPTII,S$GLB,, | Performed by: STUDENT IN AN ORGANIZED HEALTH CARE EDUCATION/TRAINING PROGRAM

## 2025-03-19 PROCEDURE — 99214 OFFICE O/P EST MOD 30 MIN: CPT | Mod: S$GLB,,, | Performed by: STUDENT IN AN ORGANIZED HEALTH CARE EDUCATION/TRAINING PROGRAM

## 2025-03-19 PROCEDURE — 1159F MED LIST DOCD IN RCRD: CPT | Mod: CPTII,S$GLB,, | Performed by: STUDENT IN AN ORGANIZED HEALTH CARE EDUCATION/TRAINING PROGRAM

## 2025-03-19 PROCEDURE — 3074F SYST BP LT 130 MM HG: CPT | Mod: CPTII,S$GLB,, | Performed by: STUDENT IN AN ORGANIZED HEALTH CARE EDUCATION/TRAINING PROGRAM

## 2025-03-19 PROCEDURE — 99999 PR PBB SHADOW E&M-EST. PATIENT-LVL III: CPT | Mod: PBBFAC,,, | Performed by: STUDENT IN AN ORGANIZED HEALTH CARE EDUCATION/TRAINING PROGRAM

## 2025-03-19 PROCEDURE — 1126F AMNT PAIN NOTED NONE PRSNT: CPT | Mod: CPTII,S$GLB,, | Performed by: STUDENT IN AN ORGANIZED HEALTH CARE EDUCATION/TRAINING PROGRAM

## 2025-03-19 PROCEDURE — 1101F PT FALLS ASSESS-DOCD LE1/YR: CPT | Mod: CPTII,S$GLB,, | Performed by: STUDENT IN AN ORGANIZED HEALTH CARE EDUCATION/TRAINING PROGRAM

## 2025-03-19 RX ORDER — CYCLOBENZAPRINE HCL 5 MG
5 TABLET ORAL 2 TIMES DAILY PRN
Qty: 42 TABLET | Refills: 0 | Status: SHIPPED | OUTPATIENT
Start: 2025-03-19

## 2025-03-19 RX ORDER — ALPRAZOLAM 1 MG/1
TABLET ORAL
Qty: 12 TABLET | Refills: 2 | Status: SHIPPED | OUTPATIENT
Start: 2025-03-19

## 2025-03-19 RX ORDER — TRAZODONE HYDROCHLORIDE 100 MG/1
100 TABLET ORAL NIGHTLY
Qty: 90 TABLET | Refills: 3 | Status: SHIPPED | OUTPATIENT
Start: 2025-03-19

## 2025-03-19 RX ORDER — GABAPENTIN 300 MG/1
300 CAPSULE ORAL NIGHTLY PRN
Qty: 90 CAPSULE | Refills: 3 | Status: SHIPPED | OUTPATIENT
Start: 2025-03-19

## 2025-03-19 RX ORDER — VENLAFAXINE HYDROCHLORIDE 75 MG/1
75 CAPSULE, EXTENDED RELEASE ORAL DAILY
Qty: 90 CAPSULE | Refills: 3 | Status: SHIPPED | OUTPATIENT
Start: 2025-03-19

## 2025-03-19 RX ORDER — AMLODIPINE BESYLATE 5 MG/1
5 TABLET ORAL DAILY
Qty: 90 TABLET | Refills: 3 | Status: SHIPPED | OUTPATIENT
Start: 2025-03-19

## 2025-03-19 NOTE — PROGRESS NOTES
Assessment:       1. Essential hypertension    2. JENNA (generalized anxiety disorder)    3. Current moderate episode of major depressive disorder without prior episode    4. Muscle pain    5. Primary insomnia    6. Bilateral hand numbness    7. Cervical pain (neck)    8. Obesity, Class II, BMI 35-39.9           Plan:     Assessment & Plan        IMPRESSION:  - Reviewed recent labs from September, noting pre-diabetic range and acceptable cholesterol levels.  - Assessed current medication regimen, including Xanax, Amlodipine, Omeprazole, Trazodone, and Effexor.  - Evaluated report of neck and shoulder pain, hand tingling, and muscle cramps.  - Considered muscle relaxant as an alternative to steroid treatment for pain management.    PLAN SUMMARY:  - Reviewed labs from September indicating pre-diabetic range  - Continue Trazodone for sleep, alternating with OTC melatonin  - Initiated muscle relaxant for nighttime pain relief  - Continue Xanax 0.5 mg as needed, provided 1-year refill  - Continue Amlodipine 5 mg tablet daily in the morning  - Modified Effexor dosage to one 75 mg tablet daily  - Educated patient on careful discontinuation of Effexor if deciding to stop in future  - Discussed potential side effects of muscle relaxants, particularly drowsiness    F32.1 CURRENT MODERATE EPISODE OF MAJOR DEPRESSIVE DISORDER WITHOUT PRIOR EPISODE:  - Modified Effexor dosage from two 37.5 mg tablets to one 75 mg tablet daily.  - Educated the patient about the importance of careful discontinuation for Effexor if deciding to stop in the future.    I10 ESSENTIAL HYPERTENSION:  - Continued Amlodipine 5 mg tablet daily in the morning.  - Provided refills for 1 year.    F41.1 JENNA (GENERALIZED ANXIETY DISORDER):  - Continued Xanax 0.5 mg as needed.  - Provided refill for Xanax.    M79.10 MUSCLE PAIN:  - Initiated muscle relaxant to be taken at night for pain relief.  - Discussed potential side effects of muscle relaxants, particularly  drowsiness.    F51.01 PRIMARY INSOMNIA:  - Continued Trazodone for sleep, alternating with OTC melatonin.    E66.01 SEVERE OBESITY (BMI 35.0-39.9) WITH COMORBIDITY:  - Verena reports gaining weight back due to consuming ice cream with bananas.  - Reviewed labs from September, which indicated the patient was in the pre-diabetic range.  - Noted cholesterol levels are slightly elevated but not significantly high.         Essential hypertension  -     amLODIPine (NORVASC) 5 MG tablet; Take 1 tablet (5 mg total) by mouth once daily.  Dispense: 90 tablet; Refill: 3    JENNA (generalized anxiety disorder)  -     venlafaxine (EFFEXOR-XR) 75 MG 24 hr capsule; Take 1 capsule (75 mg total) by mouth once daily.  Dispense: 90 capsule; Refill: 3  -     ALPRAZolam (XANAX) 1 MG tablet; Take up to 2-3 tabs per week.  Up to 12 tabs for 1 month supply.  Dispense: 12 tablet; Refill: 2  -     traZODone (DESYREL) 100 MG tablet; Take 1 tablet (100 mg total) by mouth every evening.  Dispense: 90 tablet; Refill: 3    Current moderate episode of major depressive disorder without prior episode  -     venlafaxine (EFFEXOR-XR) 75 MG 24 hr capsule; Take 1 capsule (75 mg total) by mouth once daily.  Dispense: 90 capsule; Refill: 3  -     ALPRAZolam (XANAX) 1 MG tablet; Take up to 2-3 tabs per week.  Up to 12 tabs for 1 month supply.  Dispense: 12 tablet; Refill: 2    Muscle pain  -     gabapentin (NEURONTIN) 300 MG capsule; Take 1 capsule (300 mg total) by mouth nightly as needed (muscle aches or pains).  Dispense: 90 capsule; Refill: 3  -     cyclobenzaprine (FLEXERIL) 5 MG tablet; Take 1 tablet (5 mg total) by mouth 2 (two) times daily as needed for Muscle spasms.  Dispense: 42 tablet; Refill: 0    Primary insomnia  -     traZODone (DESYREL) 100 MG tablet; Take 1 tablet (100 mg total) by mouth every evening.  Dispense: 90 tablet; Refill: 3    Bilateral hand numbness  Comments:  on Sat 3/15/25.  Related neck pain.  Orders:  -     X-Ray Cervical  Spine AP And Lateral; Future; Expected date: 03/19/2025  -     cyclobenzaprine (FLEXERIL) 5 MG tablet; Take 1 tablet (5 mg total) by mouth 2 (two) times daily as needed for Muscle spasms.  Dispense: 42 tablet; Refill: 0    Cervical pain (neck)  -     X-Ray Cervical Spine AP And Lateral; Future; Expected date: 03/19/2025  -     cyclobenzaprine (FLEXERIL) 5 MG tablet; Take 1 tablet (5 mg total) by mouth 2 (two) times daily as needed for Muscle spasms.  Dispense: 42 tablet; Refill: 0    Obesity, Class II, BMI 35-39.9                This note was generated with the assistance of ambient listening technology. Verbal consent was obtained by the patient and accompanying visitor(s) for the recording of patient appointment to facilitate this note. I attest to having reviewed and edited the generated note for accuracy, though some syntax or spelling errors may persist. Please contact the author of this note for any clarification.      Subjective:           Patient ID: Verena Stephenson   Age:  69 y.o.  Sex: female     Chief Complaint:   Follow-up (refills)      History of Present Illness:    Verena Stephenson is a 69 y.o. female who presents today with a chief complaint of Follow-up (refills)  .    Has been having neck pain and back pain.  Then yesterday was having hand pains, so worried of pinched nerve.    Was getting charliehorses in legs, so started potassium as banana in ice cream.      Is tolerating Venlafaxine well.  Hoping year will get better and find things will work out for her.        History of Present Illness    CHIEF COMPLAINT:  Verena presents today for medication refills    RECENT MENTAL HEALTH:  She reports experiencing a difficult start to 2025, describing feeling sluggish and unwell during the current week. She recently experienced the loss of her mother-in-law.    MUSCULOSKELETAL:  She reports severe neck and shoulder pain, particularly pronounced this week. She experiences hand tingling and cramping  from the wrist distally, affecting all fingers including the pinky. The hand symptoms are worse at night and more painful than previous episodes. These symptoms may be related to extensive computer use. She has a history of trigger finger affecting the thumb, which is now showing improvement with increased range of motion. She reports recent kosta horses, for which she has increased banana consumption.    LABS:  September labs showed pre-diabetic range glucose. Cholesterol levels were acceptable. TSH was normal.    CURRENT MEDICATIONS:  She takes:  - Effexor 37.5 mg two tablets daily  - Amlodipine 5 mg morning for blood pressure  - Xanax 0.5 mg occasionally, typically using half tablet 90% of the time  - Omeprazole as needed for antacid  - Alternates between trazodone and melatonin for sleep, using melatonin for about three consecutive days before switching to trazodone to maintain efficacy      ROS:  Musculoskeletal: +neck pain, +body aches, +pain with movement, +leg cramping, +muscle cramps  Neurological: +headache, +tingling  Psychiatric: +sleep difficulty           Review of Systems   Constitutional:  Positive for unexpected weight change. Negative for activity change.   HENT:  Negative for hearing loss, rhinorrhea and trouble swallowing.    Eyes:  Negative for discharge and visual disturbance.   Respiratory:  Negative for chest tightness and wheezing.    Cardiovascular:  Negative for chest pain and palpitations.   Gastrointestinal:  Negative for blood in stool, constipation, diarrhea and vomiting.   Endocrine: Negative for polydipsia and polyuria.   Genitourinary:  Positive for difficulty urinating. Negative for dysuria, hematuria and menstrual problem.   Musculoskeletal:  Negative for arthralgias, joint swelling and neck pain.   Neurological:  Negative for weakness and headaches.   Psychiatric/Behavioral:  Positive for decreased concentration, dysphoric mood and sleep disturbance. Negative for confusion. The  patient is nervous/anxious.             Objective:        Vitals:    03/19/25 1447   BP: 122/70   BP Location: Right arm   Patient Position: Sitting   Pulse: 70   SpO2: 96%   Weight: 83.4 kg (183 lb 15.6 oz)   Height: 5' (1.524 m)       Body mass index is 35.93 kg/m².      Physical Exam  Constitutional:       General: She is not in acute distress.     Appearance: Normal appearance. She is obese.      Comments: As per BMI.   HENT:      Head: Normocephalic.      Right Ear: External ear normal.      Left Ear: External ear normal.      Mouth/Throat:      Mouth: Mucous membranes are moist.   Eyes:      Extraocular Movements: Extraocular movements intact.      Conjunctiva/sclera: Conjunctivae normal.   Cardiovascular:      Rate and Rhythm: Normal rate.      Heart sounds:      No gallop.   Pulmonary:      Effort: Pulmonary effort is normal. No respiratory distress.      Breath sounds: Normal breath sounds.   Abdominal:      General: Abdomen is flat. Bowel sounds are normal. There is no distension.      Palpations: Abdomen is soft.   Musculoskeletal:         General: No swelling or deformity.      Right lower leg: No edema.      Left lower leg: No edema.   Lymphadenopathy:      Cervical: No cervical adenopathy.   Skin:     General: Skin is warm.      Capillary Refill: Capillary refill takes less than 2 seconds.      Coloration: Skin is not jaundiced.   Neurological:      General: No focal deficit present.      Mental Status: She is alert and oriented to person, place, and time.      Motor: No weakness.   Psychiatric:      Comments: States that holiday season was stressful.    Seems higher stress than last appt.          Physical Exam    Vitals: All normal.               Past Medical History[1]    Lab Results   Component Value Date     09/27/2024    K 3.9 09/27/2024     09/27/2024    CO2 27 09/27/2024    BUN 12 09/27/2024    CREATININE 0.79 09/27/2024     Lab Results   Component Value Date    HGBA1C 5.7 (H)  "09/27/2024     No results found for: "BNP", "BNPTRIAGEBLO"    Lab Results   Component Value Date    WBC 8.1 09/27/2024    HGB 15.0 09/27/2024    HCT 47.2 (H) 09/27/2024     09/27/2024     Lab Results   Component Value Date    CHOL 201 (H) 09/27/2024    HDL 45 (L) 09/27/2024    LDLCALC 131 (H) 09/27/2024    TRIG 142 09/27/2024        Encounter Medications[2]              [1]   Past Medical History:  Diagnosis Date    Chronic constipation     Depression     Hypertension     Neuromuscular disorder    [2]   Outpatient Encounter Medications as of 3/19/2025   Medication Sig Dispense Refill    melatonin 10 mg Tab Take 1 tablet by mouth nightly as needed.      omeprazole (PRILOSEC) 40 MG capsule Take 1 capsule (40 mg total) by mouth daily as needed (acid reflux). 90 capsule 3    [DISCONTINUED] ALPRAZolam (XANAX) 1 MG tablet Take up to 2-3 tabs per week.  Up to 12 tabs for 1 month supply. 12 tablet 2    [DISCONTINUED] amLODIPine (NORVASC) 5 MG tablet Take 1 tablet (5 mg total) by mouth once daily. 90 tablet 3    [DISCONTINUED] gabapentin (NEURONTIN) 300 MG capsule Take 1 capsule (300 mg total) by mouth nightly as needed (muscle aches or pains). 90 capsule 3    [DISCONTINUED] traZODone (DESYREL) 100 MG tablet Take 1 tablet (100 mg total) by mouth every evening. 30 tablet 11    [DISCONTINUED] venlafaxine (EFFEXOR-XR) 37.5 MG 24 hr capsule Take 1 capsule (37.5 mg total) by mouth once daily for 30 days, THEN 2 capsules (75 mg total) once daily. 150 capsule 0    ALPRAZolam (XANAX) 1 MG tablet Take up to 2-3 tabs per week.  Up to 12 tabs for 1 month supply. 12 tablet 2    amLODIPine (NORVASC) 5 MG tablet Take 1 tablet (5 mg total) by mouth once daily. 90 tablet 3    cyclobenzaprine (FLEXERIL) 5 MG tablet Take 1 tablet (5 mg total) by mouth 2 (two) times daily as needed for Muscle spasms. 42 tablet 0    gabapentin (NEURONTIN) 300 MG capsule Take 1 capsule (300 mg total) by mouth nightly as needed (muscle aches or pains). 90 " capsule 3    traZODone (DESYREL) 100 MG tablet Take 1 tablet (100 mg total) by mouth every evening. 90 tablet 3    venlafaxine (EFFEXOR-XR) 75 MG 24 hr capsule Take 1 capsule (75 mg total) by mouth once daily. 90 capsule 3     No facility-administered encounter medications on file as of 3/19/2025.

## 2025-03-20 NOTE — PATIENT INSTRUCTIONS
IMPRESSION:  - Reviewed recent labs from September, noting pre-diabetic range and acceptable cholesterol levels.  - Assessed current medication regimen, including Xanax, Amlodipine, Omeprazole, Trazodone, and Effexor.  - Evaluated report of neck and shoulder pain, hand tingling, and muscle cramps.  - Considered muscle relaxant as an alternative to steroid treatment for pain management.    PLAN SUMMARY:  - Reviewed labs from September indicating pre-diabetic range  - Continue Trazodone for sleep, alternating with OTC melatonin  - Initiated muscle relaxant for nighttime pain relief  - Continue Xanax 0.5 mg as needed, provided 1-year refill  - Continue Amlodipine 5 mg tablet daily in the morning  - Modified Effexor dosage to one 75 mg tablet daily  - Educated patient on careful discontinuation of Effexor if deciding to stop in future  - Discussed potential side effects of muscle relaxants, particularly drowsiness    F32.1 CURRENT MODERATE EPISODE OF MAJOR DEPRESSIVE DISORDER WITHOUT PRIOR EPISODE:  - Modified Effexor dosage from two 37.5 mg tablets to one 75 mg tablet daily.  - Educated the patient about the importance of careful discontinuation for Effexor if deciding to stop in the future.    I10 ESSENTIAL HYPERTENSION:  - Continued Amlodipine 5 mg tablet daily in the morning.  - Provided refills for 1 year.    F41.1 JENNA (GENERALIZED ANXIETY DISORDER):  - Continued Xanax 0.5 mg as needed.  - Provided refill for Xanax.    M79.10 MUSCLE PAIN:  - Initiated muscle relaxant to be taken at night for pain relief.  - Discussed potential side effects of muscle relaxants, particularly drowsiness.    F51.01 PRIMARY INSOMNIA:  - Continued Trazodone for sleep, alternating with OTC melatonin.    E66.01 SEVERE OBESITY (BMI 35.0-39.9) WITH COMORBIDITY:  - Verena reports gaining weight back due to consuming ice cream with bananas.  - Reviewed labs from September, which indicated the patient was in the pre-diabetic range.  -  Noted cholesterol levels are slightly elevated but not significantly high.

## 2025-07-22 ENCOUNTER — OFFICE VISIT (OUTPATIENT)
Dept: PRIMARY CARE CLINIC | Facility: CLINIC | Age: 70
End: 2025-07-22
Payer: COMMERCIAL

## 2025-07-22 VITALS
BODY MASS INDEX: 37.07 KG/M2 | SYSTOLIC BLOOD PRESSURE: 132 MMHG | TEMPERATURE: 99 F | DIASTOLIC BLOOD PRESSURE: 62 MMHG | WEIGHT: 188.81 LBS | HEART RATE: 61 BPM | RESPIRATION RATE: 16 BRPM | HEIGHT: 60 IN | OXYGEN SATURATION: 96 %

## 2025-07-22 DIAGNOSIS — Z79.899 OTHER LONG TERM (CURRENT) DRUG THERAPY: ICD-10-CM

## 2025-07-22 DIAGNOSIS — Z00.00 ANNUAL PHYSICAL EXAM: Primary | ICD-10-CM

## 2025-07-22 DIAGNOSIS — G56.90 NEUROPATHY OF HAND, UNSPECIFIED LATERALITY: ICD-10-CM

## 2025-07-22 DIAGNOSIS — F51.01 PRIMARY INSOMNIA: ICD-10-CM

## 2025-07-22 DIAGNOSIS — I10 ESSENTIAL HYPERTENSION: Chronic | ICD-10-CM

## 2025-07-22 DIAGNOSIS — M79.10 MUSCLE PAIN: ICD-10-CM

## 2025-07-22 DIAGNOSIS — M54.50 LUMBAR BACK PAIN: ICD-10-CM

## 2025-07-22 DIAGNOSIS — K21.9 GASTROESOPHAGEAL REFLUX DISEASE WITHOUT ESOPHAGITIS: ICD-10-CM

## 2025-07-22 DIAGNOSIS — R20.0 BILATERAL HAND NUMBNESS: ICD-10-CM

## 2025-07-22 DIAGNOSIS — F32.1 CURRENT MODERATE EPISODE OF MAJOR DEPRESSIVE DISORDER WITHOUT PRIOR EPISODE: ICD-10-CM

## 2025-07-22 DIAGNOSIS — M54.2 CERVICAL PAIN (NECK): ICD-10-CM

## 2025-07-22 DIAGNOSIS — R10.9 FLANK PAIN: ICD-10-CM

## 2025-07-22 DIAGNOSIS — F41.1 GAD (GENERALIZED ANXIETY DISORDER): ICD-10-CM

## 2025-07-22 PROCEDURE — 99999 PR PBB SHADOW E&M-EST. PATIENT-LVL IV: CPT | Mod: PBBFAC,,, | Performed by: STUDENT IN AN ORGANIZED HEALTH CARE EDUCATION/TRAINING PROGRAM

## 2025-07-22 RX ORDER — ACETAMINOPHEN, DIPHENHYDRAMINE HCL, PHENYLEPHRINE HCL 325; 25; 5 MG/1; MG/1; MG/1
1 TABLET ORAL NIGHTLY PRN
Qty: 90 TABLET | Refills: 3 | Status: SHIPPED | OUTPATIENT
Start: 2025-07-22

## 2025-07-22 RX ORDER — GABAPENTIN 300 MG/1
CAPSULE ORAL
Qty: 180 CAPSULE | Refills: 3 | Status: SHIPPED | OUTPATIENT
Start: 2025-07-22

## 2025-07-22 RX ORDER — OMEPRAZOLE 40 MG/1
40 CAPSULE, DELAYED RELEASE ORAL DAILY PRN
Qty: 90 CAPSULE | Refills: 3 | Status: SHIPPED | OUTPATIENT
Start: 2025-07-22

## 2025-07-22 RX ORDER — TRAZODONE HYDROCHLORIDE 100 MG/1
100 TABLET ORAL NIGHTLY
Qty: 90 TABLET | Refills: 3 | Status: SHIPPED | OUTPATIENT
Start: 2025-07-22

## 2025-07-22 RX ORDER — CYCLOBENZAPRINE HCL 5 MG
5 TABLET ORAL 2 TIMES DAILY PRN
Qty: 60 TABLET | Refills: 3 | Status: SHIPPED | OUTPATIENT
Start: 2025-07-22

## 2025-07-22 RX ORDER — VENLAFAXINE HYDROCHLORIDE 75 MG/1
75 CAPSULE, EXTENDED RELEASE ORAL DAILY
Qty: 90 CAPSULE | Refills: 3 | Status: SHIPPED | OUTPATIENT
Start: 2025-07-22

## 2025-07-22 RX ORDER — ALPRAZOLAM 1 MG/1
TABLET ORAL
Qty: 12 TABLET | Refills: 2 | Status: SHIPPED | OUTPATIENT
Start: 2025-07-22

## 2025-07-22 RX ORDER — AMLODIPINE BESYLATE 5 MG/1
5 TABLET ORAL DAILY
Qty: 90 TABLET | Refills: 3 | Status: SHIPPED | OUTPATIENT
Start: 2025-07-22

## 2025-07-22 NOTE — PROGRESS NOTES
Assessment:       1. Annual physical exam    2. JENNA (generalized anxiety disorder)    3. Current moderate episode of major depressive disorder without prior episode    4. Essential hypertension    5. Muscle pain    6. Bilateral hand numbness    7. Cervical pain (neck)    8. Gastroesophageal reflux disease without esophagitis    9. Primary insomnia    10. Other long term (current) drug therapy    11. Flank pain    12. Lumbar back pain    13. Neuropathy of hand, unspecified laterality           Plan:     Assessment & Plan    F32.1 Current moderate episode of major depressive disorder without prior episode  F41.1 JENNA (generalized anxiety disorder)  I10 Essential hypertension  M79.10 Muscle pain  R20.0 Bilateral hand numbness  M54.2 Cervical pain (neck)  K21.9 Gastroesophageal reflux disease without esophagitis  F51.01 Primary insomnia  Z79.899 Other long term (current) drug therapy  R10.9 Flank pain  Z00.00 Annual physical exam  M54.50 Lumbar back pain  G56.90 Neuropathy of hand, unspecified laterality    PLAN SUMMARY:  - Order CBC, CMP, lipid panel, A1C, thyroid panel, urinalysis with reflex culture after September 1st  - Continue Flexeril, provided 55 tablets; can take 1 additional tablet as needed  - Continue Xanax, Melatonin, Trazodone, Prilosec, Venlafaxine 75 mg daily  - Continue Amlodipine, provided 90 pills with 3 refills  - Adjust Gabapentin: Take 1 300 mg tablet at night for neuropathic pain  - Refer to orthopedic spine specialist for evaluation of cervical, lumbar, and hand numbness  - Caroleen with different tablet holding methods or accessories to reduce hand symptoms  - Consider replacing old pillows and evaluating current mattress for better sleep posture    F32.1 CURRENT MODERATE EPISODE OF MAJOR DEPRESSIVE DISORDER WITHOUT PRIOR EPISODE:  - Continued Venlafaxine 75 mg daily.    F41.1 JENNA (GENERALIZED ANXIETY DISORDER):  - Refilled Xanax.    I10 ESSENTIAL HYPERTENSION:  - Reviewed recent lab results  from outside provider, noting excellent cholesterol levels (total cholesterol 162, HDL 50, LDL 94).  - Compared to previous in-house labs from September 2024, which showed higher cholesterol levels (total 201, HDL 45, ).  - Explained ideal ratios for HDL to total cholesterol.  - Continued Amlodipine, provided 90 pills with 3 refills.    M79.10 MUSCLE PAIN:  - Continued Flexeril, provided 55 tablets.    R20.0 BILATERAL HAND NUMBNESS:  - Evaluated reports of hand numbness, considering potential spinal involvement.  - Considered ergonomic factors related to use of iPad for nigel and its potential contribution to hand symptoms.  - Provided information on ergonomic considerations for handheld devices and their impact on hand numbness.  - Verean to experiment with different tablet holding methods or accessories to potentially reduce hand numbness and pain.  - Referred to orthopedic spine specialist for evaluation of hand numbness.    M54.2 CERVICAL PAIN (NECK):  - Evaluated reports of neck issues, considering potential spinal involvement.  - Considered sleep posture and pillow use in relation to neck discomfort.  - Consider replacement of old pillows and evaluating current mattress to improve sleep posture and comfort.  - Referred to orthopedic spine specialist for evaluation of cervical pain.    K21.9 GASTROESOPHAGEAL REFLUX DISEASE WITHOUT ESOPHAGITIS:  - Continued Prilosec.    F51.01 PRIMARY INSOMNIA:  - Continued Melatonin.  - Continued Trazodone.    Z00.00 ANNUAL PHYSICAL EXAM:  - Assessed glucose level of 112, appropriate for prediabetes (previous A1C 5.7).  - Explained pre-diabetic range for glucose levels (115-125 mg/dL).  - Ordered CBC, CMP, lipid panel, A1C, thyroid panel, urinalysis with reflex culture to be completed after September 1st.    M54.50 LUMBAR BACK PAIN:  - Evaluated reports of back pain, considering potential spinal involvement.  - Considered sleep posture and pillow use in relation to  back discomfort.  - Consider replacement of old pillows and evaluating current mattress to improve sleep posture and comfo  rt.  - Referred to orthopedic spine specialist for evaluation of lumbar pain.    G56.90 NEUROPATHY OF HAND, UNSPECIFIED LATERALITY:  - Adjusted Gabapentin: Take 1 300 mg tablet at night for neuropathic pain.  - Can take 1 additional tablet in morning or evening as needed for further pain relief.  - Maximum daily dose: 2 300 mg tablets.             Annual physical exam    JENNA (generalized anxiety disorder)  -     ALPRAZolam (XANAX) 1 MG tablet; Take up to 2-3 tabs per week.  Up to 12 tabs for 1 month supply.  Dispense: 12 tablet; Refill: 2  -     traZODone (DESYREL) 100 MG tablet; Take 1 tablet (100 mg total) by mouth every evening.  Dispense: 90 tablet; Refill: 3  -     venlafaxine (EFFEXOR-XR) 75 MG 24 hr capsule; Take 1 capsule (75 mg total) by mouth once daily.  Dispense: 90 capsule; Refill: 3  -     Urinalysis w/reflex to Microscopic; Future; Expected date: 09/01/2025    Current moderate episode of major depressive disorder without prior episode  -     ALPRAZolam (XANAX) 1 MG tablet; Take up to 2-3 tabs per week.  Up to 12 tabs for 1 month supply.  Dispense: 12 tablet; Refill: 2  -     venlafaxine (EFFEXOR-XR) 75 MG 24 hr capsule; Take 1 capsule (75 mg total) by mouth once daily.  Dispense: 90 capsule; Refill: 3  -     CBC Auto Differential; Future; Expected date: 09/01/2025  -     Comprehensive Metabolic Panel; Future; Expected date: 09/01/2025  -     Lipid Panel; Future; Expected date: 09/01/2025  -     Hemoglobin A1C; Future; Expected date: 09/01/2025  -     TSH; Future; Expected date: 09/01/2025    Essential hypertension  -     amLODIPine (NORVASC) 5 MG tablet; Take 1 tablet (5 mg total) by mouth once daily.  Dispense: 90 tablet; Refill: 3    Muscle pain  -     cyclobenzaprine (FLEXERIL) 5 MG tablet; Take 1 tablet (5 mg total) by mouth 2 (two) times daily as needed for Muscle spasms.   Dispense: 60 tablet; Refill: 3  -     gabapentin (NEURONTIN) 300 MG capsule; Take 1 tab by mouth at night for neuropathic pain, can take 1 additional tab in morning or in evening as needed for neuro pain.  Dispense: 180 capsule; Refill: 3    Bilateral hand numbness  Comments:  on Sat 3/15/25.  Related neck pain.  Orders:  -     cyclobenzaprine (FLEXERIL) 5 MG tablet; Take 1 tablet (5 mg total) by mouth 2 (two) times daily as needed for Muscle spasms.  Dispense: 60 tablet; Refill: 3    Cervical pain (neck)  -     cyclobenzaprine (FLEXERIL) 5 MG tablet; Take 1 tablet (5 mg total) by mouth 2 (two) times daily as needed for Muscle spasms.  Dispense: 60 tablet; Refill: 3  -     Ambulatory referral/consult to Orthopedics; Future; Expected date: 07/29/2025    Gastroesophageal reflux disease without esophagitis  -     omeprazole (PRILOSEC) 40 MG capsule; Take 1 capsule (40 mg total) by mouth daily as needed (acid reflux).  Dispense: 90 capsule; Refill: 3    Primary insomnia  -     melatonin 10 mg Tab; Take 1 tablet (10 mg total) by mouth nightly as needed (insomnia).  Dispense: 90 tablet; Refill: 3  -     traZODone (DESYREL) 100 MG tablet; Take 1 tablet (100 mg total) by mouth every evening.  Dispense: 90 tablet; Refill: 3  -     CBC Auto Differential; Future; Expected date: 09/01/2025  -     Comprehensive Metabolic Panel; Future; Expected date: 09/01/2025  -     Lipid Panel; Future; Expected date: 09/01/2025  -     Hemoglobin A1C; Future; Expected date: 09/01/2025  -     TSH; Future; Expected date: 09/01/2025    Other long term (current) drug therapy  -     CBC Auto Differential; Future; Expected date: 09/01/2025  -     Comprehensive Metabolic Panel; Future; Expected date: 09/01/2025  -     Lipid Panel; Future; Expected date: 09/01/2025  -     Hemoglobin A1C; Future; Expected date: 09/01/2025  -     TSH; Future; Expected date: 09/01/2025    Flank pain  -     Urinalysis w/reflex to Microscopic; Future; Expected date:  09/01/2025    Lumbar back pain  -     Ambulatory referral/consult to Orthopedics; Future; Expected date: 07/29/2025    Neuropathy of hand, unspecified laterality  -     Ambulatory referral/consult to Orthopedics; Future; Expected date: 07/29/2025                This note was generated with the assistance of ambient listening technology. Verbal consent was obtained by the patient and accompanying visitor(s) for the recording of patient appointment to facilitate this note. I attest to having reviewed and edited the generated note for accuracy, though some syntax or spelling errors may persist. Please contact the author of this note for any clarification.      Subjective:           Patient ID: Verena Stephenson   Age:  69 y.o.  Sex: female     Chief Complaint:   Medication Refill      History of Present Illness:    Verena Stephenson is a 69 y.o. female who presents today with a chief complaint of Medication Refill  .    68yo female.      Having significant and worsening back pain.  Getting numbness in hands when playing video games.  Thinks is related to her back.    Plays on an I-pad.  States is a relaxation after the end of the day.     States that can barely get out the bed in the morning with her back pain.  Had pain when getting up at night to void.   Sleeps on her back.         History of Present Illness    CHIEF COMPLAINT:  Verena presents today for back pain and numbness in fingers.    BACK PAIN AND ASSOCIATED SYMPTOMS:  She reports chronic back pain with significant morning stiffness and difficulty getting out of bed. While able to work throughout the day, her pain becomes more pronounced during rest periods, particularly after work. She currently sleeps on her back with a flatter pillow to improve neck and shoulder alignment, noting previous discomfort with elevated pillows. She believes her current mattress may be exacerbating her symptoms.    NEUROLOGICAL SYMPTOMS:  She experiences numbness and pain in her  hands during evening activities, particularly while playing video games on her iPad and after household activities such as dishes and cooking.    MEDICATIONS:  Current medications include:  - Amlodipine 90 pills with three refills  - Flexeril as needed for muscle relaxation (prefers one tablet, reports minimal grogginess)  - Gabapentin 300mg at night (considering increase to 600mg for nerve symptoms)  - Venlafaxine 75mg daily for anxiety, depression, and potential neuropathic pain benefits  - Additional medications: melatonin, Prilosec, Trazodone, and Xanax    LABS:  Total cholesterol 162, glucose 112, A1C 5.7, HDL 50, LDL 94      ROS:  Musculoskeletal: +back pain, +difficulty standing up  Neurological: +numbness                 Review of Systems   Constitutional:  Positive for unexpected weight change. Negative for activity change.   HENT:  Negative for hearing loss, rhinorrhea and trouble swallowing.    Eyes:  Negative for discharge and visual disturbance.   Respiratory:  Negative for chest tightness and wheezing.    Cardiovascular:  Negative for chest pain and palpitations.   Gastrointestinal:  Negative for blood in stool, constipation, diarrhea and vomiting.   Endocrine: Negative for polydipsia and polyuria.   Genitourinary:  Positive for difficulty urinating. Negative for dysuria, hematuria and menstrual problem.   Musculoskeletal:  Positive for arthralgias, back pain, myalgias and neck pain. Negative for joint swelling.   Neurological:  Negative for weakness and headaches.   Psychiatric/Behavioral:  Positive for decreased concentration, dysphoric mood and sleep disturbance. Negative for confusion. The patient is nervous/anxious.            Objective:        Vitals:    07/22/25 1451   BP: 132/62   BP Location: Right arm   Patient Position: Sitting   Pulse: 61   Resp: 16   Temp: 98.5 °F (36.9 °C)   TempSrc: Oral   SpO2: 96%   Weight: 85.6 kg (188 lb 13.2 oz)   Height: 5' (1.524 m)       Body mass index is 36.88  "kg/m².      Physical Exam  Constitutional:       General: She is not in acute distress.     Appearance: Normal appearance. She is obese.      Comments: As per BMI.   HENT:      Head: Normocephalic.      Right Ear: External ear normal.      Left Ear: External ear normal.      Mouth/Throat:      Mouth: Mucous membranes are moist.   Eyes:      Extraocular Movements: Extraocular movements intact.      Conjunctiva/sclera: Conjunctivae normal.   Cardiovascular:      Rate and Rhythm: Normal rate.      Pulses: Normal pulses.      Heart sounds:      No gallop.   Pulmonary:      Effort: Pulmonary effort is normal. No respiratory distress.      Breath sounds: Normal breath sounds.   Abdominal:      General: Abdomen is flat. Bowel sounds are normal. There is no distension.      Palpations: Abdomen is soft.   Musculoskeletal:         General: No swelling or deformity.      Right lower leg: No edema.      Left lower leg: No edema.   Lymphadenopathy:      Cervical: No cervical adenopathy.   Skin:     General: Skin is warm.      Capillary Refill: Capillary refill takes less than 2 seconds.      Coloration: Skin is not jaundiced.   Neurological:      General: No focal deficit present.      Mental Status: She is alert and oriented to person, place, and time.      Motor: No weakness.   Psychiatric:      Comments: Voicing some stress and back pain issues.  Having issues with sleep.         Physical Exam                    Past Medical History[1]    Lab Results   Component Value Date     09/27/2024    K 3.9 09/27/2024     09/27/2024    CO2 27 09/27/2024    BUN 12 09/27/2024    CREATININE 0.79 09/27/2024     Lab Results   Component Value Date    HGBA1C 5.7 (H) 09/27/2024     No results found for: "BNP", "BNPTRIAGEBLO"    Lab Results   Component Value Date    WBC 8.1 09/27/2024    HGB 15.0 09/27/2024    HCT 47.2 (H) 09/27/2024     09/27/2024     Lab Results   Component Value Date    CHOL 201 (H) 09/27/2024    HDL 45 (L) " 09/27/2024    LDLCALC 131 (H) 09/27/2024    TRIG 142 09/27/2024        Encounter Medications[2]              [1]   Past Medical History:  Diagnosis Date    Chronic constipation     Depression     Hypertension     Neuromuscular disorder    [2]   Outpatient Encounter Medications as of 7/22/2025   Medication Sig Dispense Refill    [DISCONTINUED] ALPRAZolam (XANAX) 1 MG tablet Take up to 2-3 tabs per week.  Up to 12 tabs for 1 month supply. 12 tablet 2    [DISCONTINUED] amLODIPine (NORVASC) 5 MG tablet Take 1 tablet (5 mg total) by mouth once daily. 90 tablet 3    [DISCONTINUED] cyclobenzaprine (FLEXERIL) 5 MG tablet Take 1 tablet (5 mg total) by mouth 2 (two) times daily as needed for Muscle spasms. 42 tablet 0    [DISCONTINUED] gabapentin (NEURONTIN) 300 MG capsule Take 1 capsule (300 mg total) by mouth nightly as needed (muscle aches or pains). 90 capsule 3    [DISCONTINUED] melatonin 10 mg Tab Take 1 tablet by mouth nightly as needed.      [DISCONTINUED] omeprazole (PRILOSEC) 40 MG capsule Take 1 capsule (40 mg total) by mouth daily as needed (acid reflux). 90 capsule 3    [DISCONTINUED] traZODone (DESYREL) 100 MG tablet Take 1 tablet (100 mg total) by mouth every evening. 90 tablet 3    [DISCONTINUED] venlafaxine (EFFEXOR-XR) 75 MG 24 hr capsule Take 1 capsule (75 mg total) by mouth once daily. 90 capsule 3    ALPRAZolam (XANAX) 1 MG tablet Take up to 2-3 tabs per week.  Up to 12 tabs for 1 month supply. 12 tablet 2    amLODIPine (NORVASC) 5 MG tablet Take 1 tablet (5 mg total) by mouth once daily. 90 tablet 3    cyclobenzaprine (FLEXERIL) 5 MG tablet Take 1 tablet (5 mg total) by mouth 2 (two) times daily as needed for Muscle spasms. 60 tablet 3    gabapentin (NEURONTIN) 300 MG capsule Take 1 tab by mouth at night for neuropathic pain, can take 1 additional tab in morning or in evening as needed for neuro pain. 180 capsule 3    melatonin 10 mg Tab Take 1 tablet (10 mg total) by mouth nightly as needed (insomnia).  90 tablet 3    omeprazole (PRILOSEC) 40 MG capsule Take 1 capsule (40 mg total) by mouth daily as needed (acid reflux). 90 capsule 3    traZODone (DESYREL) 100 MG tablet Take 1 tablet (100 mg total) by mouth every evening. 90 tablet 3    venlafaxine (EFFEXOR-XR) 75 MG 24 hr capsule Take 1 capsule (75 mg total) by mouth once daily. 90 capsule 3     No facility-administered encounter medications on file as of 7/22/2025.

## 2025-08-07 ENCOUNTER — OFFICE VISIT (OUTPATIENT)
Dept: ORTHOPEDICS | Facility: CLINIC | Age: 70
End: 2025-08-07
Payer: COMMERCIAL

## 2025-08-07 VITALS
DIASTOLIC BLOOD PRESSURE: 78 MMHG | WEIGHT: 188.94 LBS | HEIGHT: 60 IN | SYSTOLIC BLOOD PRESSURE: 126 MMHG | BODY MASS INDEX: 37.09 KG/M2 | HEART RATE: 73 BPM

## 2025-08-07 DIAGNOSIS — M75.51 BURSITIS OF RIGHT SHOULDER: Primary | ICD-10-CM

## 2025-08-07 PROCEDURE — 99999 PR PBB SHADOW E&M-EST. PATIENT-LVL III: CPT | Mod: PBBFAC,,,

## 2025-08-07 RX ORDER — TRIAMCINOLONE ACETONIDE 40 MG/ML
40 INJECTION, SUSPENSION INTRA-ARTICULAR; INTRAMUSCULAR
Status: COMPLETED | OUTPATIENT
Start: 2025-08-07 | End: 2025-08-07

## 2025-08-07 RX ORDER — TRIAMCINOLONE ACETONIDE 40 MG/ML
40 INJECTION, SUSPENSION INTRA-ARTICULAR; INTRAMUSCULAR
Status: DISCONTINUED | OUTPATIENT
Start: 2025-08-07 | End: 2025-08-07 | Stop reason: HOSPADM

## 2025-08-07 RX ADMIN — TRIAMCINOLONE ACETONIDE 40 MG: 40 INJECTION, SUSPENSION INTRA-ARTICULAR; INTRAMUSCULAR at 10:08

## 2025-08-07 NOTE — PROCEDURES
Large Joint Aspiration/Injection: R subacromial bursa    Date/Time: 8/7/2025 10:00 AM    Performed by: Adry Ramsey PA-C  Authorized by: Adry Ramsey PA-C    Site marked: the procedure site was marked    Timeout: prior to procedure the correct patient, procedure, and site was verified    Prep: patient was prepped and draped in usual sterile fashion    Local anesthesia used?: No    Local anesthetic:  Topical anesthetic  Anesthetic total (ml):  4      Details:  Needle Size:  21 G  Ultrasonic Guidance for needle placement?: No    Approach:  Posterior  Location:  Shoulder  Site:  R subacromial bursa  Medications:  40 mg triamcinolone acetonide 40 mg/mL  Patient tolerance:  Patient tolerated the procedure well with no immediate complications

## 2025-08-07 NOTE — PROGRESS NOTES
Patient ID: Verena Stephenson is a 69 y.o. female    Pain of the Right Shoulder    History of Present Illness:    Verena Stephenson presents to clinic for right shoulder pain. Patient denies known BEENA. The pain started 2 years ago and is becoming progressively worse.  Pain is located over (points to) anterior shoulder . She reports her pain is a 5 /10 described as soreness and with pain to the touch. She reports that the pain is worse at night, waking her up from sleep. The pain is affecting ADLs and limiting desired level of activity. No longer wants to garden. Denies numbness, tingling, radiation and inability to bear weight.     She does endorse nighttime pain.  She does try melatonin, gabapentin and Flexeril with some improvement but this is not get rid of all of her pain.  Inquiring about injection today.    Occupation:  [employer] employee    Ambulating:  Unassisted  Diabetic: no  Smoking: no  Hx of DVT/PE: no    PAST MEDICAL HISTORY:   Past Medical History:   Diagnosis Date    Chronic constipation     Depression     Hypertension     Neuromuscular disorder      PAST SURGICAL HISTORY:   Past Surgical History:   Procedure Laterality Date    CHOLECYSTECTOMY      TUBAL LIGATION      WISDOM TOOTH EXTRACTION       FAMILY HISTORY:   Family History   Problem Relation Name Age of Onset    Diabetes Mother          boarderline    Heart disease Father      Hypertension Father      COPD Father      Emphysema Father          heavy smoker    Stroke Brother [id number] [phone]    Breast cancer Cousin      Colon cancer Neg Hx      Ovarian cancer Neg Hx      Lung cancer Neg Hx      Heart attack Neg Hx       SOCIAL HISTORY:   Social History     Occupational History    Occupation: school board   Tobacco Use    Smoking status: Never    Smokeless tobacco: Never   Substance and Sexual Activity    Alcohol use: No    Drug use: No    Sexual activity: Yes     Partners: Male     Birth control/protection: Post-menopausal         MEDICATIONS: Current Medications[1]  ALLERGIES: Review of patient's allergies indicates:  No Known Allergies      Physical Exam     Vitals:    08/07/25 1000   BP: 126/78   Pulse: 73     Alert and oriented to person, place and time. No acute distress. Well-groomed, not ill appearing. Pupils round and reactive, normal respiratory effort, no audible wheezing.     Shoulder / Upper Extremity Exam    OBSERVATION:     Swelling  none  Deformity  none   Discoloration  none   Scapular winging none   Scars   none  Atrophy  none    TENDERNESS                Clavicle   Negative         AC Jt.    Negative        SC Jt.    Negative          Acromion:  Negative        Scapular Spine Negative   Supraspinatus  Negative       Infraspinatus  Negative   LH Biceps   +   Greater Tub.  Negative   Trapezius  Negative   Cervical spine  Negative        ROM: (* = with pain)              FE    170°       ER at 0°    60°        ER at 90° ABD  90°       IR at 90°  ABD   NA         IR (spine level)   T10         STRENGTH: (* = with pain)    SCAPTION   5/5        IR    5/5       ER    5/5       BICEPS   5/5       Deltoid    5/5         SIGNS:  Painful side       NEER   neg    OKENANS  neg    VILLANUEVA   neg    SPEEDS  neg     DROP ARM   neg   BELLY PRESS neg   Superior escape none    LIFT-OFF  neg   X-Body ADD    neg    MOVING VALGUS neg        STABILITY TESTING        Translation       Anterior  Normal      Posterior  Normal     Sulcus   < 10mm     Signs    Apprehension   neg   Relocation   no change        Jerk test  neg         Imaging:     Right shoulder X-rays ordered/reviewed by me showing no evidence of fracture or dislocation. There is no obvious malalignment. No evidence of masses, lesions or foreign bodies.  Mild AC joint arthritis    Assessment & Plan    Bursitis of right shoulder  -     triamcinolone acetonide injection 40 mg  -     Large Joint Aspiration/Injection: R subacromial bursa         I made the decision to obtain old  records of the patient including previous notes and imaging. New imaging was ordered today of the extremity or extremities evaluated. I independently reviewed and interpreted the radiographs and/or MRIs/CT scan today as well as prior imaging.    We discussed at length different treatment options including conservative vs surgical management. These include anti-inflammatories, acetaminophen, rest, ice, heat, formal physical therapy including strengthening and stretching exercises, home exercise programs, injections, dry needling, and finally surgical intervention.      Patient here for chronic right shoulder pain that has recently been worsening.  We discussed trial of injection today which she is interested in.  Right shoulder CSI given, post-injection instructions reviewed.  Continue activity as tolerated.  If no improvement may consider MRI.    Follow up: as needed  X-rays next visit: none    All questions were answered and patient is agreeable to the above plan.                    [1]   Current Outpatient Medications:     ALPRAZolam (XANAX) 1 MG tablet, Take up to 2-3 tabs per week.  Up to 12 tabs for 1 month supply., Disp: 12 tablet, Rfl: 2    amLODIPine (NORVASC) 5 MG tablet, Take 1 tablet (5 mg total) by mouth once daily., Disp: 90 tablet, Rfl: 3    cyclobenzaprine (FLEXERIL) 5 MG tablet, Take 1 tablet (5 mg total) by mouth 2 (two) times daily as needed for Muscle spasms., Disp: 60 tablet, Rfl: 3    gabapentin (NEURONTIN) 300 MG capsule, Take 1 tab by mouth at night for neuropathic pain, can take 1 additional tab in morning or in evening as needed for neuro pain., Disp: 180 capsule, Rfl: 3    melatonin 10 mg Tab, Take 1 tablet (10 mg total) by mouth nightly as needed (insomnia)., Disp: 90 tablet, Rfl: 3    omeprazole (PRILOSEC) 40 MG capsule, Take 1 capsule (40 mg total) by mouth daily as needed (acid reflux)., Disp: 90 capsule, Rfl: 3    traZODone (DESYREL) 100 MG tablet, Take 1 tablet (100 mg total) by mouth  every evening., Disp: 90 tablet, Rfl: 3    venlafaxine (EFFEXOR-XR) 75 MG 24 hr capsule, Take 1 capsule (75 mg total) by mouth once daily., Disp: 90 capsule, Rfl: 3  No current facility-administered medications for this visit.